# Patient Record
Sex: MALE | Race: WHITE | NOT HISPANIC OR LATINO | Employment: UNEMPLOYED | URBAN - METROPOLITAN AREA
[De-identification: names, ages, dates, MRNs, and addresses within clinical notes are randomized per-mention and may not be internally consistent; named-entity substitution may affect disease eponyms.]

---

## 2017-01-12 ENCOUNTER — TELEPHONE (OUTPATIENT)
Dept: VASCULAR SURGERY | Facility: CLINIC | Age: 57
End: 2017-01-12

## 2017-01-16 DIAGNOSIS — R60.0 LOCALIZED EDEMA: Primary | ICD-10-CM

## 2017-02-22 ENCOUNTER — TELEPHONE (OUTPATIENT)
Dept: VASCULAR SURGERY | Facility: CLINIC | Age: 57
End: 2017-02-22

## 2017-03-15 ENCOUNTER — TELEPHONE (OUTPATIENT)
Dept: RADIOLOGY | Facility: HOSPITAL | Age: 57
End: 2017-03-15

## 2017-03-16 ENCOUNTER — HOSPITAL ENCOUNTER (OUTPATIENT)
Dept: RADIOLOGY | Facility: HOSPITAL | Age: 57
Discharge: HOME | End: 2017-03-16
Attending: THORACIC SURGERY (CARDIOTHORACIC VASCULAR SURGERY)
Payer: MEDICARE

## 2017-03-16 ENCOUNTER — TELEPHONE (OUTPATIENT)
Dept: VASCULAR SURGERY | Facility: CLINIC | Age: 57
End: 2017-03-16

## 2017-03-16 DIAGNOSIS — R60.0 LOCALIZED EDEMA: ICD-10-CM

## 2017-03-16 PROCEDURE — 93970 EXTREMITY STUDY: CPT | Mod: TC,PO

## 2017-03-16 PROCEDURE — 93970 EXTREMITY STUDY: CPT | Mod: 26,,, | Performed by: RADIOLOGY

## 2017-03-16 NOTE — TELEPHONE ENCOUNTER
Oliver w/ [facility] states upon doing scan she detected blood clot in right  Pt to see his PCP this afternoon.   on vacation.

## 2017-03-30 ENCOUNTER — OFFICE VISIT (OUTPATIENT)
Dept: VASCULAR SURGERY | Facility: CLINIC | Age: 57
End: 2017-03-30
Payer: MEDICARE

## 2017-03-30 VITALS
RESPIRATION RATE: 16 BRPM | BODY MASS INDEX: 25.67 KG/M2 | WEIGHT: 200 LBS | SYSTOLIC BLOOD PRESSURE: 145 MMHG | DIASTOLIC BLOOD PRESSURE: 99 MMHG | HEIGHT: 74 IN | HEART RATE: 81 BPM

## 2017-03-30 DIAGNOSIS — F17.218 NICOTINE DEPENDENCE, CIGARETTES, WITH OTHER NICOTINE-INDUCED DISORDERS: ICD-10-CM

## 2017-03-30 DIAGNOSIS — I83.813 VARICOSE VEINS OF BOTH LOWER EXTREMITIES WITH PAIN: ICD-10-CM

## 2017-03-30 DIAGNOSIS — I87.2 VENOUS INSUFFICIENCY OF LEFT LOWER EXTREMITY: Primary | ICD-10-CM

## 2017-03-30 PROCEDURE — 99406 BEHAV CHNG SMOKING 3-10 MIN: CPT | Mod: S$PBB,,, | Performed by: THORACIC SURGERY (CARDIOTHORACIC VASCULAR SURGERY)

## 2017-03-30 PROCEDURE — 99215 OFFICE O/P EST HI 40 MIN: CPT | Mod: 25,S$PBB,, | Performed by: THORACIC SURGERY (CARDIOTHORACIC VASCULAR SURGERY)

## 2017-03-30 PROCEDURE — 99213 OFFICE O/P EST LOW 20 MIN: CPT | Mod: PBBFAC,PO | Performed by: THORACIC SURGERY (CARDIOTHORACIC VASCULAR SURGERY)

## 2017-03-30 PROCEDURE — 99999 PR PBB SHADOW E&M-EST. PATIENT-LVL III: CPT | Mod: PBBFAC,,, | Performed by: THORACIC SURGERY (CARDIOTHORACIC VASCULAR SURGERY)

## 2017-03-30 RX ORDER — TRAZODONE HYDROCHLORIDE 100 MG/1
TABLET ORAL
COMMUNITY
Start: 2017-03-10 | End: 2021-05-26

## 2017-03-30 RX ORDER — HYDROCODONE BITARTRATE AND ACETAMINOPHEN 7.5; 325 MG/1; MG/1
1 TABLET ORAL EVERY 6 HOURS PRN
Refills: 0 | COMMUNITY
Start: 2017-03-16 | End: 2021-05-26

## 2017-03-30 RX ORDER — INDOMETHACIN 50 MG/1
50 CAPSULE ORAL
COMMUNITY
Start: 2015-12-23 | End: 2021-05-26

## 2017-03-30 RX ORDER — LEVETIRACETAM 750 MG/1
TABLET ORAL
Status: ON HOLD | COMMUNITY
Start: 2017-02-12 | End: 2023-08-20 | Stop reason: HOSPADM

## 2017-03-30 RX ORDER — METHOCARBAMOL 750 MG/1
750 TABLET, FILM COATED ORAL 3 TIMES DAILY PRN
Refills: 1 | COMMUNITY
Start: 2017-03-21 | End: 2021-05-26

## 2017-03-30 NOTE — PROGRESS NOTES
OFFICE VISIT NOTE    HISTORY OF PRESENT ILLNESS:  The patient is a 57-year-old gentleman who I last   saw in the early parts of 2014.  He was sent to me in consultation for a right   lower extremity venous stasis ulcer.  We performed endovenous laser ablation of   the right greater saphenous vein and the right lesser saphenous vein along with   Veinlite-guided sclerotherapy.  The ulcer healed up and has not recurred.    However, the patient is complaining of persistent pain on the lateral aspect of   his right foot, mostly inferior to the lateral malleolus and over the lateral   aspect of the ankle.  He is also complaining of pain on the medial aspect of the   middle one-third of the left leg.  The patient has been wearing tight socks,   but not compression stockings that is prescription.  He continues to smoke   cigarettes.    PAST MEDICAL HISTORY:  Venous stasis ulcers of the right leg, seizure disorder,   venous insufficiency of bilateral greater saphenous veins, pain in bilateral   lower extremities.  Deep venous thrombosis of bilateral lower extremities.    PAST SURGICAL HISTORY:  Endovenous laser treatment of the right greater and the   right lesser saphenous veins.    MEDICATIONS:  Indocin, Keppra, Xarelto, Desyrel, Lortab, Norco, Robaxin.    ALLERGIES:  No known drug allergies.    FAMILY HISTORY:  Noncontributory.    SOCIAL HISTORY:  He smokes a quarter-pack of cigarettes per day.  He denies   alcohol use.  He was counseled regarding smoking cessation for about 5 minutes.    I counseled him regarding this in 2014 with the patient has not even tried to   quit smoking.  Again, the effects of cigarette smoking on the cardiovascular and   respiratory systems were discussed.    REVISION OF SYSTEMS:  He complains of pain of bilateral lower extremities with   discoloration.  He also complains of some varicose veins.  All other systems   were reviewed and are negative.    PHYSICAL EXAMINATION:  VITAL SIGNS:  Blood  pressure is 145/99, respiratory rate is 16, heart rate is   81, height 6 feet 2 inches, weight 200 pounds.  GENERAL:  He is awake and alert, in no apparent distress.  HEENT:  Head is normocephalic.  Pupils are equal, round, reactive.  Sclerae are   anicteric.  NECK:  Supple.  LUNGS:  Clear.  HEART:  Has a regular rate and rhythm.  ABDOMEN:  Soft and nontender.  EXTREMITIES:  He has hyperpigmentation of bilateral legs, ankle and feet, worse   on the right than on the left.  He has scars on the medial aspect of the distal   right leg consistent with healed venous stasis ulcers.  Feet are pink and warm   with good capillary refill.  He has varicose veins of bilateral lower   extremities.  PULSE:  2+ brachial, 2+ radial, 2+ femoral, 2+ dorsalis pedis pulses   bilaterally.  NEUROLOGIC:  Awake, alert and oriented.  No lateralizing neurologic deficits.    Duplex scan of the veins of the lower extremities showed occluded right greater   and right lesser saphenous veins from previous endovenous laser treatment.  The   left greater saphenous vein had insufficiency, but the vein was normal in size.    At the saphenofemoral junction, it measured 6.4 mm, but below that it measured   5.5 mm.    IMPRESSION:  1.  Pain of bilateral lower extremities.  2.  Varicose veins of bilateral lower extremities.  3.  Status post EVLT of the right greater saphenous vein.  4.  Status post EVLT of the right lesser saphenous vein.  5.  Status post Veinlite-guided sclerotherapy of the right lower extremity.  6.  History of venous stasis of the right lower extremity.  7.  Venous stasis of bilateral lower extremities.    RECOMMENDATIONS:  The patient has no insufficient veins in the right lower   extremity, but continues to have pain.  He has venous insufficiency of the left   greater saphenous vein and the lesser saphenous vein, but these are normal in   size.  It can still be used as conduit for bypass.  His pain in his left lower   extremity is  limited to an area of approximately 8 cm by about 3 cm.  The   patient needs to quit smoking and he was counseled regarding this as described   above for about 5 minutes.  I think he needs to wear compression stockings.  A   new prescription for knee high 20 to 30 mmHg compression stockings was given.    The only other thing I have to offer would be endovenous laser treatment of the   left greater saphenous vein and possibly the lesser saphenous vein, but that   would do nothing to help the pain in the right lower extremity and the vein is   not enlarged.  He is also no encourage to elevate the legs as much as possible   and exercise as much as possible.      JENNI  dd: 03/30/2017 09:20:03 (CDT)  td: 03/31/2017 03:35:49 (CDT)  Doc ID   #[id number]  Job ID #[id number]    CC:

## 2021-05-26 ENCOUNTER — HOSPITAL ENCOUNTER (EMERGENCY)
Facility: HOSPITAL | Age: 61
Discharge: HOME | End: 2021-05-26
Attending: EMERGENCY MEDICINE
Payer: COMMERCIAL

## 2021-05-26 VITALS
WEIGHT: 175 LBS | DIASTOLIC BLOOD PRESSURE: 82 MMHG | OXYGEN SATURATION: 99 % | BODY MASS INDEX: 22.46 KG/M2 | TEMPERATURE: 99 F | HEIGHT: 74 IN | RESPIRATION RATE: 14 BRPM | HEART RATE: 70 BPM | SYSTOLIC BLOOD PRESSURE: 135 MMHG

## 2021-05-26 DIAGNOSIS — E86.0 DEHYDRATION: ICD-10-CM

## 2021-05-26 DIAGNOSIS — I95.9 HYPOTENSION: Primary | ICD-10-CM

## 2021-05-26 DIAGNOSIS — Z20.822 COVID-19 VIRUS NOT DETECTED: ICD-10-CM

## 2021-05-26 LAB
ALBUMIN SERPL BCP-MCNC: 3.6 G/DL (ref 3.5–5.2)
ALP SERPL-CCNC: 53 U/L (ref 55–135)
ALT SERPL W/O P-5'-P-CCNC: 23 U/L (ref 10–44)
ANION GAP SERPL CALC-SCNC: 11 MMOL/L (ref 8–16)
AST SERPL-CCNC: 29 U/L (ref 10–40)
BASOPHILS # BLD AUTO: 0.02 K/UL (ref 0–0.2)
BASOPHILS NFR BLD: 0.5 % (ref 0–1.9)
BILIRUB SERPL-MCNC: 0.6 MG/DL (ref 0.1–1)
BILIRUB UR QL STRIP: NEGATIVE
BNP SERPL-MCNC: 63 PG/ML (ref 0–99)
BUN SERPL-MCNC: 10 MG/DL (ref 8–23)
CALCIUM SERPL-MCNC: 8.7 MG/DL (ref 8.7–10.5)
CHLORIDE SERPL-SCNC: 102 MMOL/L (ref 95–110)
CLARITY UR: CLEAR
CO2 SERPL-SCNC: 22 MMOL/L (ref 23–29)
COLOR UR: YELLOW
CREAT SERPL-MCNC: 1.1 MG/DL (ref 0.5–1.4)
CTP QC/QA: YES
DIFFERENTIAL METHOD: ABNORMAL
EOSINOPHIL # BLD AUTO: 0.1 K/UL (ref 0–0.5)
EOSINOPHIL NFR BLD: 2.1 % (ref 0–8)
ERYTHROCYTE [DISTWIDTH] IN BLOOD BY AUTOMATED COUNT: 13.7 % (ref 11.5–14.5)
EST. GFR  (AFRICAN AMERICAN): >60 ML/MIN/1.73 M^2
EST. GFR  (NON AFRICAN AMERICAN): >60 ML/MIN/1.73 M^2
GLUCOSE SERPL-MCNC: 167 MG/DL (ref 70–110)
GLUCOSE UR QL STRIP: NEGATIVE
HCT VFR BLD AUTO: 44.4 % (ref 40–54)
HGB BLD-MCNC: 14.9 G/DL (ref 14–18)
HGB UR QL STRIP: NEGATIVE
IMM GRANULOCYTES # BLD AUTO: 0.01 K/UL (ref 0–0.04)
IMM GRANULOCYTES NFR BLD AUTO: 0.2 % (ref 0–0.5)
KETONES UR QL STRIP: NEGATIVE
LACTATE SERPL-SCNC: 1.6 MMOL/L (ref 0.5–2.2)
LACTATE SERPL-SCNC: 2.4 MMOL/L (ref 0.5–2.2)
LEUKOCYTE ESTERASE UR QL STRIP: NEGATIVE
LYMPHOCYTES # BLD AUTO: 1.2 K/UL (ref 1–4.8)
LYMPHOCYTES NFR BLD: 27.5 % (ref 18–48)
MCH RBC QN AUTO: 31.1 PG (ref 27–31)
MCHC RBC AUTO-ENTMCNC: 33.6 G/DL (ref 32–36)
MCV RBC AUTO: 93 FL (ref 82–98)
MONOCYTES # BLD AUTO: 0.4 K/UL (ref 0.3–1)
MONOCYTES NFR BLD: 8.3 % (ref 4–15)
NEUTROPHILS # BLD AUTO: 2.7 K/UL (ref 1.8–7.7)
NEUTROPHILS NFR BLD: 61.4 % (ref 38–73)
NITRITE UR QL STRIP: NEGATIVE
NRBC BLD-RTO: 0 /100 WBC
PH UR STRIP: 7 [PH] (ref 5–8)
PLATELET # BLD AUTO: 169 K/UL (ref 150–450)
PMV BLD AUTO: 10.6 FL (ref 9.2–12.9)
POTASSIUM SERPL-SCNC: 3.8 MMOL/L (ref 3.5–5.1)
PROCALCITONIN SERPL IA-MCNC: 0.04 NG/ML
PROT SERPL-MCNC: 7.4 G/DL (ref 6–8.4)
PROT UR QL STRIP: NEGATIVE
RBC # BLD AUTO: 4.79 M/UL (ref 4.6–6.2)
SARS-COV-2 RDRP RESP QL NAA+PROBE: NEGATIVE
SODIUM SERPL-SCNC: 135 MMOL/L (ref 136–145)
SP GR UR STRIP: 1.02 (ref 1–1.03)
TROPONIN I SERPL DL<=0.01 NG/ML-MCNC: 0.01 NG/ML (ref 0–0.03)
URN SPEC COLLECT METH UR: NORMAL
UROBILINOGEN UR STRIP-ACNC: NEGATIVE EU/DL
WBC # BLD AUTO: 4.32 K/UL (ref 3.9–12.7)

## 2021-05-26 PROCEDURE — 93010 EKG 12-LEAD: ICD-10-PCS | Mod: ,,, | Performed by: INTERNAL MEDICINE

## 2021-05-26 PROCEDURE — 99285 EMERGENCY DEPT VISIT HI MDM: CPT | Mod: 25

## 2021-05-26 PROCEDURE — 25000003 PHARM REV CODE 250: Performed by: REGISTERED NURSE

## 2021-05-26 PROCEDURE — 84145 PROCALCITONIN (PCT): CPT | Performed by: REGISTERED NURSE

## 2021-05-26 PROCEDURE — 83605 ASSAY OF LACTIC ACID: CPT | Mod: 91 | Performed by: REGISTERED NURSE

## 2021-05-26 PROCEDURE — 83880 ASSAY OF NATRIURETIC PEPTIDE: CPT | Performed by: REGISTERED NURSE

## 2021-05-26 PROCEDURE — U0002 COVID-19 LAB TEST NON-CDC: HCPCS | Performed by: REGISTERED NURSE

## 2021-05-26 PROCEDURE — 93010 ELECTROCARDIOGRAM REPORT: CPT | Mod: ,,, | Performed by: INTERNAL MEDICINE

## 2021-05-26 PROCEDURE — 96361 HYDRATE IV INFUSION ADD-ON: CPT

## 2021-05-26 PROCEDURE — 87040 BLOOD CULTURE FOR BACTERIA: CPT | Mod: 59 | Performed by: REGISTERED NURSE

## 2021-05-26 PROCEDURE — 85025 COMPLETE CBC W/AUTO DIFF WBC: CPT | Performed by: REGISTERED NURSE

## 2021-05-26 PROCEDURE — 96360 HYDRATION IV INFUSION INIT: CPT

## 2021-05-26 PROCEDURE — 81003 URINALYSIS AUTO W/O SCOPE: CPT | Performed by: REGISTERED NURSE

## 2021-05-26 PROCEDURE — 93005 ELECTROCARDIOGRAM TRACING: CPT

## 2021-05-26 PROCEDURE — 84484 ASSAY OF TROPONIN QUANT: CPT | Performed by: REGISTERED NURSE

## 2021-05-26 PROCEDURE — 80053 COMPREHEN METABOLIC PANEL: CPT | Performed by: REGISTERED NURSE

## 2021-05-26 RX ORDER — ASPIRIN 81 MG/1
81 TABLET ORAL DAILY
COMMUNITY

## 2021-05-26 RX ADMIN — SODIUM CHLORIDE 1400 ML: 0.9 INJECTION, SOLUTION INTRAVENOUS at 07:05

## 2021-05-26 RX ADMIN — SODIUM CHLORIDE 1000 ML: 0.9 INJECTION, SOLUTION INTRAVENOUS at 05:05

## 2021-06-01 LAB
BACTERIA BLD CULT: NORMAL
BACTERIA BLD CULT: NORMAL

## 2023-08-15 ENCOUNTER — HOSPITAL ENCOUNTER (INPATIENT)
Facility: HOSPITAL | Age: 63
LOS: 5 days | Discharge: HOME | DRG: 100 | End: 2023-08-20
Attending: INTERNAL MEDICINE | Admitting: INTERNAL MEDICINE
Payer: MEDICAID

## 2023-08-15 DIAGNOSIS — G40.901 NONINTRACTABLE EPILEPSY WITH STATUS EPILEPTICUS, UNSPECIFIED EPILEPSY TYPE: Primary | ICD-10-CM

## 2023-08-15 DIAGNOSIS — G40.901 STATUS EPILEPTICUS: ICD-10-CM

## 2023-08-15 DIAGNOSIS — G93.40 ENCEPHALOPATHY: ICD-10-CM

## 2023-08-15 PROBLEM — Z97.8 ENDOTRACHEALLY INTUBATED: Status: ACTIVE | Noted: 2023-08-15

## 2023-08-15 PROBLEM — Z86.718 HISTORY OF DVT IN ADULTHOOD: Status: ACTIVE | Noted: 2023-08-15

## 2023-08-15 PROBLEM — Z95.828 PRESENCE OF IVC FILTER: Status: ACTIVE | Noted: 2023-08-15

## 2023-08-15 PROBLEM — R79.89 ELEVATED LACTIC ACID LEVEL: Status: ACTIVE | Noted: 2023-08-15

## 2023-08-15 PROBLEM — Z99.11 ON MECHANICALLY ASSISTED VENTILATION: Status: ACTIVE | Noted: 2023-08-15

## 2023-08-15 PROBLEM — E72.20 HYPERAMMONEMIA: Status: ACTIVE | Noted: 2023-08-15

## 2023-08-15 PROBLEM — J69.0 ASPIRATION PNEUMONIA OF RIGHT LOWER LOBE: Status: ACTIVE | Noted: 2023-08-15

## 2023-08-15 PROBLEM — Z79.01 CHRONIC ANTICOAGULATION: Status: ACTIVE | Noted: 2023-08-15

## 2023-08-15 PROCEDURE — 99900035 HC TECH TIME PER 15 MIN (STAT)

## 2023-08-15 PROCEDURE — 99900026 HC AIRWAY MAINTENANCE (STAT)

## 2023-08-15 PROCEDURE — 94003 VENT MGMT INPAT SUBQ DAY: CPT

## 2023-08-15 PROCEDURE — 63600175 PHARM REV CODE 636 W HCPCS: Performed by: STUDENT IN AN ORGANIZED HEALTH CARE EDUCATION/TRAINING PROGRAM

## 2023-08-15 PROCEDURE — 27200966 HC CLOSED SUCTION SYSTEM

## 2023-08-15 PROCEDURE — 94761 N-INVAS EAR/PLS OXIMETRY MLT: CPT

## 2023-08-15 PROCEDURE — 20000000 HC ICU ROOM

## 2023-08-15 PROCEDURE — 27000221 HC OXYGEN, UP TO 24 HOURS

## 2023-08-15 PROCEDURE — 25000003 PHARM REV CODE 250: Performed by: STUDENT IN AN ORGANIZED HEALTH CARE EDUCATION/TRAINING PROGRAM

## 2023-08-15 RX ORDER — NAPROXEN SODIUM 220 MG/1
81 TABLET, FILM COATED ORAL DAILY
Status: DISCONTINUED | OUTPATIENT
Start: 2023-08-16 | End: 2023-08-18

## 2023-08-15 RX ORDER — POTASSIUM CHLORIDE 7.45 MG/ML
80 INJECTION INTRAVENOUS
Status: DISCONTINUED | OUTPATIENT
Start: 2023-08-15 | End: 2023-08-16

## 2023-08-15 RX ORDER — MAGNESIUM SULFATE HEPTAHYDRATE 40 MG/ML
4 INJECTION, SOLUTION INTRAVENOUS
Status: DISCONTINUED | OUTPATIENT
Start: 2023-08-15 | End: 2023-08-16

## 2023-08-15 RX ORDER — FAMOTIDINE 10 MG/ML
20 INJECTION INTRAVENOUS EVERY 12 HOURS
Status: DISCONTINUED | OUTPATIENT
Start: 2023-08-15 | End: 2023-08-17

## 2023-08-15 RX ORDER — MAGNESIUM SULFATE HEPTAHYDRATE 40 MG/ML
2 INJECTION, SOLUTION INTRAVENOUS
Status: DISCONTINUED | OUTPATIENT
Start: 2023-08-15 | End: 2023-08-16

## 2023-08-15 RX ORDER — SODIUM CHLORIDE 0.9 % (FLUSH) 0.9 %
10 SYRINGE (ML) INJECTION
Status: DISCONTINUED | OUTPATIENT
Start: 2023-08-15 | End: 2023-08-20 | Stop reason: HOSPADM

## 2023-08-15 RX ORDER — POTASSIUM CHLORIDE 7.45 MG/ML
60 INJECTION INTRAVENOUS
Status: DISCONTINUED | OUTPATIENT
Start: 2023-08-15 | End: 2023-08-16

## 2023-08-15 RX ORDER — LEVETIRACETAM 500 MG/5ML
1500 INJECTION, SOLUTION, CONCENTRATE INTRAVENOUS EVERY 12 HOURS
Status: DISCONTINUED | OUTPATIENT
Start: 2023-08-15 | End: 2023-08-18

## 2023-08-15 RX ORDER — FENTANYL CITRATE-0.9 % NACL/PF 10 MCG/ML
0-200 PLASTIC BAG, INJECTION (ML) INTRAVENOUS CONTINUOUS
Status: DISCONTINUED | OUTPATIENT
Start: 2023-08-15 | End: 2023-08-17

## 2023-08-15 RX ORDER — POTASSIUM CHLORIDE 7.45 MG/ML
40 INJECTION INTRAVENOUS
Status: DISCONTINUED | OUTPATIENT
Start: 2023-08-15 | End: 2023-08-16

## 2023-08-15 RX ADMIN — LEVETIRACETAM 1500 MG: 100 INJECTION INTRAVENOUS at 11:08

## 2023-08-15 RX ADMIN — FAMOTIDINE 20 MG: 10 INJECTION, SOLUTION INTRAVENOUS at 11:08

## 2023-08-15 RX ADMIN — PROPOFOL 35 MCG/KG/MIN: 10 INJECTION, EMULSION INTRAVENOUS at 09:08

## 2023-08-15 RX ADMIN — Medication 150 MCG/HR: at 08:08

## 2023-08-16 PROBLEM — G93.40 ENCEPHALOPATHY: Status: ACTIVE | Noted: 2023-08-16

## 2023-08-16 LAB
ALBUMIN SERPL BCP-MCNC: 3 G/DL (ref 3.5–5.2)
ALLENS TEST: ABNORMAL
ALP SERPL-CCNC: 53 U/L (ref 55–135)
ALT SERPL W/O P-5'-P-CCNC: 7 U/L (ref 10–44)
AMMONIA PLAS-SCNC: 24 UMOL/L (ref 10–50)
AMORPH CRY UR QL COMP ASSIST: ABNORMAL
ANION GAP SERPL CALC-SCNC: 9 MMOL/L (ref 8–16)
AST SERPL-CCNC: 24 U/L (ref 10–40)
BACTERIA #/AREA URNS AUTO: ABNORMAL /HPF
BASOPHILS # BLD AUTO: 0.03 K/UL (ref 0–0.2)
BASOPHILS NFR BLD: 0.5 % (ref 0–1.9)
BILIRUB SERPL-MCNC: 0.7 MG/DL (ref 0.1–1)
BILIRUB UR QL STRIP: NEGATIVE
BUN SERPL-MCNC: 13 MG/DL (ref 8–23)
CALCIUM SERPL-MCNC: 7.9 MG/DL (ref 8.7–10.5)
CHLORIDE SERPL-SCNC: 101 MMOL/L (ref 95–110)
CLARITY UR REFRACT.AUTO: ABNORMAL
CO2 SERPL-SCNC: 23 MMOL/L (ref 23–29)
COLOR UR AUTO: ABNORMAL
CREAT SERPL-MCNC: 1 MG/DL (ref 0.5–1.4)
DELSYS: ABNORMAL
DIFFERENTIAL METHOD: ABNORMAL
EOSINOPHIL # BLD AUTO: 0.1 K/UL (ref 0–0.5)
EOSINOPHIL NFR BLD: 1.6 % (ref 0–8)
ERYTHROCYTE [DISTWIDTH] IN BLOOD BY AUTOMATED COUNT: 13.3 % (ref 11.5–14.5)
ERYTHROCYTE [SEDIMENTATION RATE] IN BLOOD BY WESTERGREN METHOD: 16 MM/H
EST. GFR  (NO RACE VARIABLE): >60 ML/MIN/1.73 M^2
FIO2: 30
GLUCOSE SERPL-MCNC: 78 MG/DL (ref 70–110)
GLUCOSE UR QL STRIP: NEGATIVE
HCO3 UR-SCNC: 25.8 MMOL/L (ref 24–28)
HCT VFR BLD AUTO: 34.3 % (ref 40–54)
HGB BLD-MCNC: 11.6 G/DL (ref 14–18)
HGB UR QL STRIP: ABNORMAL
HYALINE CASTS UR QL AUTO: 0 /LPF
IMM GRANULOCYTES # BLD AUTO: 0.02 K/UL (ref 0–0.04)
IMM GRANULOCYTES NFR BLD AUTO: 0.3 % (ref 0–0.5)
KETONES UR QL STRIP: NEGATIVE
LACTATE SERPL-SCNC: 1 MMOL/L (ref 0.5–2.2)
LEUKOCYTE ESTERASE UR QL STRIP: NEGATIVE
LYMPHOCYTES # BLD AUTO: 1 K/UL (ref 1–4.8)
LYMPHOCYTES NFR BLD: 15.5 % (ref 18–48)
MAGNESIUM SERPL-MCNC: 2 MG/DL (ref 1.6–2.6)
MCH RBC QN AUTO: 29.1 PG (ref 27–31)
MCHC RBC AUTO-ENTMCNC: 33.8 G/DL (ref 32–36)
MCV RBC AUTO: 86 FL (ref 82–98)
MICROSCOPIC COMMENT: ABNORMAL
MODE: ABNORMAL
MONOCYTES # BLD AUTO: 0.7 K/UL (ref 0.3–1)
MONOCYTES NFR BLD: 11.1 % (ref 4–15)
NEUTROPHILS # BLD AUTO: 4.6 K/UL (ref 1.8–7.7)
NEUTROPHILS NFR BLD: 71 % (ref 38–73)
NITRITE UR QL STRIP: NEGATIVE
NRBC BLD-RTO: 0 /100 WBC
PCO2 BLDA: 45.4 MMHG (ref 35–45)
PEEP: 5
PH SMN: 7.36 [PH] (ref 7.35–7.45)
PH UR STRIP: 6 [PH] (ref 5–8)
PHOSPHATE SERPL-MCNC: 2.2 MG/DL (ref 2.7–4.5)
PLATELET # BLD AUTO: 150 K/UL (ref 150–450)
PMV BLD AUTO: 10.6 FL (ref 9.2–12.9)
PO2 BLDA: 130 MMHG (ref 80–100)
POC BE: 0 MMOL/L
POC SATURATED O2: 99 % (ref 95–100)
POC TCO2: 27 MMOL/L (ref 23–27)
POTASSIUM SERPL-SCNC: 3.2 MMOL/L (ref 3.5–5.1)
PROT SERPL-MCNC: 7 G/DL (ref 6–8.4)
PROT UR QL STRIP: ABNORMAL
RBC # BLD AUTO: 3.98 M/UL (ref 4.6–6.2)
RBC #/AREA URNS AUTO: 33 /HPF (ref 0–4)
SAMPLE: ABNORMAL
SITE: ABNORMAL
SODIUM SERPL-SCNC: 133 MMOL/L (ref 136–145)
SP GR UR STRIP: >1.03 (ref 1–1.03)
T4 FREE SERPL-MCNC: 0.81 NG/DL (ref 0.71–1.51)
TSH SERPL DL<=0.005 MIU/L-ACNC: 24.39 UIU/ML (ref 0.4–4)
URN SPEC COLLECT METH UR: ABNORMAL
VT: 420
WBC # BLD AUTO: 6.4 K/UL (ref 3.9–12.7)
WBC #/AREA URNS AUTO: 0 /HPF (ref 0–5)

## 2023-08-16 PROCEDURE — 82803 BLOOD GASES ANY COMBINATION: CPT

## 2023-08-16 PROCEDURE — 84439 ASSAY OF FREE THYROXINE: CPT | Performed by: NURSE PRACTITIONER

## 2023-08-16 PROCEDURE — 27100171 HC OXYGEN HIGH FLOW UP TO 24 HOURS

## 2023-08-16 PROCEDURE — 63600175 PHARM REV CODE 636 W HCPCS: Performed by: STUDENT IN AN ORGANIZED HEALTH CARE EDUCATION/TRAINING PROGRAM

## 2023-08-16 PROCEDURE — 95714 VEEG EA 12-26 HR UNMNTR: CPT

## 2023-08-16 PROCEDURE — 85025 COMPLETE CBC W/AUTO DIFF WBC: CPT | Performed by: STUDENT IN AN ORGANIZED HEALTH CARE EDUCATION/TRAINING PROGRAM

## 2023-08-16 PROCEDURE — 25000003 PHARM REV CODE 250: Performed by: STUDENT IN AN ORGANIZED HEALTH CARE EDUCATION/TRAINING PROGRAM

## 2023-08-16 PROCEDURE — 81001 URINALYSIS AUTO W/SCOPE: CPT | Performed by: STUDENT IN AN ORGANIZED HEALTH CARE EDUCATION/TRAINING PROGRAM

## 2023-08-16 PROCEDURE — 84443 ASSAY THYROID STIM HORMONE: CPT | Performed by: NURSE PRACTITIONER

## 2023-08-16 PROCEDURE — 51798 US URINE CAPACITY MEASURE: CPT

## 2023-08-16 PROCEDURE — 99900035 HC TECH TIME PER 15 MIN (STAT)

## 2023-08-16 PROCEDURE — 84100 ASSAY OF PHOSPHORUS: CPT | Performed by: STUDENT IN AN ORGANIZED HEALTH CARE EDUCATION/TRAINING PROGRAM

## 2023-08-16 PROCEDURE — 20000000 HC ICU ROOM

## 2023-08-16 PROCEDURE — 99900026 HC AIRWAY MAINTENANCE (STAT)

## 2023-08-16 PROCEDURE — 82140 ASSAY OF AMMONIA: CPT | Performed by: INTERNAL MEDICINE

## 2023-08-16 PROCEDURE — 25000003 PHARM REV CODE 250: Performed by: INTERNAL MEDICINE

## 2023-08-16 PROCEDURE — 36600 WITHDRAWAL OF ARTERIAL BLOOD: CPT

## 2023-08-16 PROCEDURE — 63600175 PHARM REV CODE 636 W HCPCS: Performed by: NURSE PRACTITIONER

## 2023-08-16 PROCEDURE — 80053 COMPREHEN METABOLIC PANEL: CPT | Performed by: STUDENT IN AN ORGANIZED HEALTH CARE EDUCATION/TRAINING PROGRAM

## 2023-08-16 PROCEDURE — 95700 EEG CONT REC W/VID EEG TECH: CPT

## 2023-08-16 PROCEDURE — 95720 EEG PHY/QHP EA INCR W/VEEG: CPT | Mod: ,,, | Performed by: PSYCHIATRY & NEUROLOGY

## 2023-08-16 PROCEDURE — 83605 ASSAY OF LACTIC ACID: CPT | Performed by: STUDENT IN AN ORGANIZED HEALTH CARE EDUCATION/TRAINING PROGRAM

## 2023-08-16 PROCEDURE — 25000003 PHARM REV CODE 250: Performed by: NURSE PRACTITIONER

## 2023-08-16 PROCEDURE — 95720 PR EEG, W/VIDEO, CONT RECORD, I&R, >12<26 HRS: ICD-10-PCS | Mod: ,,, | Performed by: PSYCHIATRY & NEUROLOGY

## 2023-08-16 PROCEDURE — 27000221 HC OXYGEN, UP TO 24 HOURS

## 2023-08-16 PROCEDURE — 83735 ASSAY OF MAGNESIUM: CPT | Performed by: STUDENT IN AN ORGANIZED HEALTH CARE EDUCATION/TRAINING PROGRAM

## 2023-08-16 PROCEDURE — 27200966 HC CLOSED SUCTION SYSTEM

## 2023-08-16 PROCEDURE — 94003 VENT MGMT INPAT SUBQ DAY: CPT

## 2023-08-16 PROCEDURE — 94761 N-INVAS EAR/PLS OXIMETRY MLT: CPT

## 2023-08-16 RX ORDER — SODIUM,POTASSIUM PHOSPHATES 280-250MG
2 POWDER IN PACKET (EA) ORAL
Status: DISCONTINUED | OUTPATIENT
Start: 2023-08-16 | End: 2023-08-18

## 2023-08-16 RX ORDER — HEPARIN SODIUM 5000 [USP'U]/ML
5000 INJECTION, SOLUTION INTRAVENOUS; SUBCUTANEOUS EVERY 8 HOURS
Status: DISCONTINUED | OUTPATIENT
Start: 2023-08-16 | End: 2023-08-16

## 2023-08-16 RX ORDER — AMOXICILLIN 250 MG
1 CAPSULE ORAL DAILY
Status: DISCONTINUED | OUTPATIENT
Start: 2023-08-16 | End: 2023-08-18

## 2023-08-16 RX ORDER — MUPIROCIN 20 MG/G
OINTMENT TOPICAL 2 TIMES DAILY
Status: DISCONTINUED | OUTPATIENT
Start: 2023-08-16 | End: 2023-08-20 | Stop reason: HOSPADM

## 2023-08-16 RX ORDER — ENOXAPARIN SODIUM 100 MG/ML
40 INJECTION SUBCUTANEOUS EVERY 24 HOURS
Status: DISCONTINUED | OUTPATIENT
Start: 2023-08-16 | End: 2023-08-18

## 2023-08-16 RX ADMIN — POTASSIUM BICARBONATE 35 MEQ: 391 TABLET, EFFERVESCENT ORAL at 10:08

## 2023-08-16 RX ADMIN — MUPIROCIN: 20 OINTMENT TOPICAL at 08:08

## 2023-08-16 RX ADMIN — FAMOTIDINE 20 MG: 10 INJECTION, SOLUTION INTRAVENOUS at 08:08

## 2023-08-16 RX ADMIN — LEVETIRACETAM 1500 MG: 100 INJECTION INTRAVENOUS at 08:08

## 2023-08-16 RX ADMIN — POTASSIUM BICARBONATE 35 MEQ: 391 TABLET, EFFERVESCENT ORAL at 05:08

## 2023-08-16 RX ADMIN — ENOXAPARIN SODIUM 40 MG: 40 INJECTION SUBCUTANEOUS at 05:08

## 2023-08-16 RX ADMIN — ASPIRIN 81 MG 81 MG: 81 TABLET ORAL at 08:08

## 2023-08-16 RX ADMIN — SENNOSIDES AND DOCUSATE SODIUM 1 TABLET: 50; 8.6 TABLET ORAL at 10:08

## 2023-08-16 RX ADMIN — POTASSIUM & SODIUM PHOSPHATES POWDER PACK 280-160-250 MG 2 PACKET: 280-160-250 PACK at 05:08

## 2023-08-16 RX ADMIN — POTASSIUM & SODIUM PHOSPHATES POWDER PACK 280-160-250 MG 2 PACKET: 280-160-250 PACK at 10:08

## 2023-08-16 RX ADMIN — Medication 75 MCG/HR: at 02:08

## 2023-08-16 RX ADMIN — PROPOFOL 35 MCG/KG/MIN: 10 INJECTION, EMULSION INTRAVENOUS at 12:08

## 2023-08-16 NOTE — NURSING
Called [facility] to advise that the patient is on comfort care. Per [facility] just call when patient reach cardiac death or on vent.

## 2023-08-16 NOTE — SUBJECTIVE & OBJECTIVE
Past Medical History:   Diagnosis Date    DVT (deep venous thrombosis)     Stroke     Venous insufficiency      Past Surgical History:   Procedure Laterality Date    evlt      GUADALUPE FILTER PLACEMENT      REVISION TOTAL HIP ARTHROPLASTY Right       Current Facility-Administered Medications on File Prior to Encounter   Medication Dose Route Frequency Provider Last Rate Last Admin    [DISCONTINUED] GENERIC EXTERNAL MEDICATION   mcg/hr Intravenous  Provider, Generic External Data        [DISCONTINUED] propofol (DIPRIVAN) 10 mg/mL infusion  5-50 mcg/kg/min Intravenous  Provider, Generic External Data         Current Outpatient Medications on File Prior to Encounter   Medication Sig Dispense Refill    aspirin (ECOTRIN) 81 MG EC tablet Take 81 mg by mouth once daily.      levetiracetam (KEPPRA) 750 MG Tab TAKE 1 TABLET BY MOUTH 4 TIMES DAILY        Allergies: Patient has no known allergies.    No family history on file.  Social History     Tobacco Use    Smoking status: Every Day     Current packs/day: 0.80     Types: Cigarettes    Tobacco comments:     3/4 pk daily   Substance Use Topics    Alcohol use: No     Review of Systems   Unable to perform ROS: Intubated     Objective:     Vitals:    Pulse: 60  BP: 114/69  Resp: 16  SpO2: 100 %  Oxygen Concentration (%): 30  Vent Mode: A/C  Set Rate: 16 BPM  Vt Set: 420 mL  PEEP/CPAP: 5 cmH20  Peak Airway Pressure: 7.3 cmH20  Mean Airway Pressure: 7.2 cmH20  Plateau Pressure: 0 cmH20    Temp  Min: 98.2 °F (36.8 °C)  Max: 98.4 °F (36.9 °C)  Pulse  Min: 60  Max: 87  BP  Min: 93/56  Max: 122/81  Resp  Min: 16  Max: 16  SpO2  Min: 100 %  Max: 100 %  Oxygen Concentration (%)  Min: 30  Max: 50    No intake/output data recorded.            Physical Exam  Vitals and nursing note reviewed.   Constitutional:       Comments:   Intubated, mechanically ventilated  Sedated with Propofol and Fentanyl    HENT:      Head: Normocephalic and atraumatic.   Eyes:      Pupils: Pupils are  equal, round, and reactive to light.   Cardiovascular:      Rate and Rhythm: Normal rate and regular rhythm.      Pulses: Normal pulses.   Pulmonary:      Comments:   Intubated and mechanically ventilated   Abdominal:      General: Abdomen is flat. There is no distension.      Tenderness: There is no abdominal tenderness.   Musculoskeletal:         General: Normal range of motion.      Cervical back: Normal range of motion. No rigidity.   Skin:     General: Skin is warm.      Comments:   BLE venous stasis changes   Neurological:      Comments:   E3VTM5  Intubated and mechanically ventilated   PERRL, no gaze deviation   Moving all extremities spontaneously antigravity        Unable to test orientation, language, memory, judgment, insight, fund of knowledge, hearing, shoulder shrug, tongue protrusion, coordination, gait due to level of consciousness.       Today I personally reviewed pertinent medications, lines/drains/airways, imaging, cardiology results, laboratory results, microbiology results, notably:    OSH CTH without acute change (only read available)  OSH CT Chest with opacification of RLL (only read available)

## 2023-08-16 NOTE — ASSESSMENT & PLAN NOTE
63M with epilepsy maintained on Keppra 750mg QID, DVT on Xarelto s/p IVC filter placement who presents as a transfer from OSH for concern of status epilepticus. Intubated at OSH due to low GCS. Received 4.5g Keppra load and started on Propofol and Fentanyl. CTH with chronic small vessel ischemic change but without acute findings. CT Chest with opacification of right lower lobe, and fusiform dilation of the ascending thoracic aorta (4.2 cm).  Initial Lactate 4.9, Ammonia 43, UDS positive for amphetamines and benzodiazepines (given per EMS).     Neuro   - Admit to NCC  - Q1h neurochecks while in ICU   - moves all limbs spontaneously when woken, does not follow commands  - Q1h vitals while in ICU  - HOB@30  - Continue Keppra 1500mg BID  - MRI Brain w/o acute intracranial pathology   - Cap EEG overnight, formal hook up today  - Propofol @35  - Fentanyl to maintain RASS 0  - lactate down-trending     Cards  - SBP <160  - EKG  - Lipid panel, A1c pending  - Coags wnl     Resp  - intubated and sedated, mechanically ventilated  - OSH CXR with RLL opacification, repeat CXR today wnl   - ABG ordered pH 7.364, pCO2 45.4, pO2 130, HCO3 25.2    Renal  - Strict I/Os  - external catheter in place   - bladder scan q6h     FEN/GI  - Daily CMP, Mag, Phos - replete electrolytes PRN  - start trickle feeds today, consult nutrition     Endo  - TSH 25   - repeat TSH elevated   - T3 and T4 wnl     H/O  - Daily CBC, transfuse PRN  - ASA daily   - Lovenox DVT ppx (will clarify home Xarelto given h/o DVT and IVC filter)    ID  - ABx pending further work up, received Vanc + Cefepime x1 PTA  - afebrile, WCC wnl     Other  - PT/OT/SLP as appropriate  - CM/SW consult for dispo planning

## 2023-08-16 NOTE — NURSING
Patient arrived to [facility] from OSH by ambulance    Report received from: OSH RNIsabelle    Type of stroke/diagnosis: status epilepticus     Current symptoms: not able to follow commands, no motor deficits in eyes or extremities; pupils 2 and brisk    Skin Assessment done: Y  Wounds noted: ulcerations on ankles and heels of both feet  *If wounds noted, was Wound Care consulted?  Y  *If wounds noted, LDA placed? Y  Skin Assessment Verified by:  Krytsal Henao Completed? Yes; failed    Patient Belongings on Admit: Pill container(no pills), dentures and case, 's license, Health Blue card    NCC notified: name of person notified: Dr. Charito MD

## 2023-08-16 NOTE — PLAN OF CARE
Recommendations     1. When medically able, initiate TF regimen of Impact Peptide 1.5 @ goal rate of 45 mL/hr- provides 1620 kcals, 101 g pro, and 832 mL fluid. Propofol provides an additional 444 kcals.   - If propofol dc, increase rate to 55 mL/hr- provides 1980 kcals, 124 g pro, and 1016 mL fluid.      2. Add Vince BID x 14 days to promote wound healing. Administer via flush.      3. Recommend adding wound healing cocktail (vit C, zinc, and MVI).      4. RD following.     Goals: Will meet % EEN/EPN by next RD f/u.  Nutrition Goal Status: new  Communication of RD Recs:  (POC)    Anne-Marie Jarrett RDN,LDN

## 2023-08-16 NOTE — CONSULTS
[facility] - Neuro Critical Care  Wound Care    Patient Name:  Sam Zamora   MRN:  [id number]  Date: 8/16/2023  Diagnosis: Status epilepticus    History:     Past Medical History:   Diagnosis Date    DVT (deep venous thrombosis)     Stroke     Venous insufficiency        Social History     Socioeconomic History    Marital status:    Tobacco Use    Smoking status: Every Day     Current packs/day: 0.80     Types: Cigarettes    Tobacco comments:     3/4 pk daily   Substance and Sexual Activity    Alcohol use: No       Precautions:     Allergies as of 08/15/2023    (No Known Allergies)       [facility] Assessment Details/Treatment     Patient seen for wound care consultation - NDNQI, ankles.   Reviewed chart for this encounter.   See Flow Sheet for findings.    Pt found lying in bed w/ bedside RN present, OK for care at this time. Heel foams removed for assessment, heels intact w/o injury. Pt has intact scabs noted to L inner calf, and R outer calf above ankles. No open wounds at this time. Heel foams replaced, will order heel lift boots.    RECOMMENDATIONS:  Bedside nursing to apply heel lift boots.    Discussed POC with patient and primary nurse.   See EMR for orders & patient education.      Nursing to continue care.  Nursing to maintain pressure injury prevention interventions.           08/16/23 1051   WOCN Assessment   WOCN Total Time (mins) 10   Visit Date 08/16/23   Visit Time 1051   Consult Type New   WOCN Speciality Wound   Intervention assessed;changed;applied;coordination of care   Pressure Injury Prevention    Heel protection technique Foam dressing   Heel preventative measures Peel back dressing/boot, assess skin and reapply

## 2023-08-16 NOTE — H&P
[facility] Critical Care  Neurocritical Care  History & Physical    Admit Date: 8/15/2023  Service Date: 08/15/2023  Length of Stay: 0    Subjective:     Chief Complaint: Status epilepticus    History of Present Illness: Sam Zamora is a 63 year old male with a medical history significant for epilepsy maintained on Keppra 750mg QID, DVT on Xarelto s/p IVC filter placement who presents as a transfer from OSH for concern of status epilepticus. Patient presented to OSH after being found down at home. EMS noted 3 witnessed seizure events without return to baseline. He was intubated at OSH due to low GCS. Received 4.5g Keppra load and started on Propofol and Fentanyl. CTH showed chronic small vessel ischemic change without acute findings. CT Chest with opacification of right lower lobe, and fusiform dilation of the ascending thoracic aorta (4.2 cm).  Lactate 4.9, Ammonia 43, UDS positive for amphetamines and benzodiazepines (given per EMS).     Recommendation was made to transfer patient to [facility] for higher level of care on continuous EEG monitoring.       Past Medical History:   Diagnosis Date    DVT (deep venous thrombosis)     Stroke     Venous insufficiency      Past Surgical History:   Procedure Laterality Date    evlt      GUADALUPE FILTER PLACEMENT      REVISION TOTAL HIP ARTHROPLASTY Right       Current Facility-Administered Medications on File Prior to Encounter   Medication Dose Route Frequency Provider Last Rate Last Admin    [DISCONTINUED] GENERIC EXTERNAL MEDICATION   mcg/hr Intravenous  Provider, Generic External Data        [DISCONTINUED] propofol (DIPRIVAN) 10 mg/mL infusion  5-50 mcg/kg/min Intravenous  Provider, Generic External Data         Current Outpatient Medications on File Prior to Encounter   Medication Sig Dispense Refill    aspirin (ECOTRIN) 81 MG EC tablet Take 81 mg by mouth once daily.      levetiracetam (KEPPRA) 750 MG Tab TAKE 1 TABLET BY MOUTH 4 TIMES DAILY         Allergies: Patient has no known allergies.    No family history on file.  Social History     Tobacco Use    Smoking status: Every Day     Current packs/day: 0.80     Types: Cigarettes    Tobacco comments:     3/4 pk daily   Substance Use Topics    Alcohol use: No     Review of Systems   Unable to perform ROS: Intubated     Objective:     Vitals:    Pulse: 60  BP: 114/69  Resp: 16  SpO2: 100 %  Oxygen Concentration (%): 30  Vent Mode: A/C  Set Rate: 16 BPM  Vt Set: 420 mL  PEEP/CPAP: 5 cmH20  Peak Airway Pressure: 7.3 cmH20  Mean Airway Pressure: 7.2 cmH20  Plateau Pressure: 0 cmH20    Temp  Min: 98.2 °F (36.8 °C)  Max: 98.4 °F (36.9 °C)  Pulse  Min: 60  Max: 87  BP  Min: 93/56  Max: 122/81  Resp  Min: 16  Max: 16  SpO2  Min: 100 %  Max: 100 %  Oxygen Concentration (%)  Min: 30  Max: 50    No intake/output data recorded.            Physical Exam  Vitals and nursing note reviewed.   Constitutional:       Comments:   Intubated, mechanically ventilated  Sedated with Propofol and Fentanyl    HENT:      Head: Normocephalic and atraumatic.   Eyes:      Pupils: Pupils are equal, round, and reactive to light.   Cardiovascular:      Rate and Rhythm: Normal rate and regular rhythm.      Pulses: Normal pulses.   Pulmonary:      Comments:   Intubated and mechanically ventilated   Abdominal:      General: Abdomen is flat. There is no distension.      Tenderness: There is no abdominal tenderness.   Musculoskeletal:         General: Normal range of motion.      Cervical back: Normal range of motion. No rigidity.   Skin:     General: Skin is warm.      Comments:   BLE venous stasis changes   Neurological:      Comments:   E3VTM5  Intubated and mechanically ventilated   PERRL, no gaze deviation   Moving all extremities spontaneously antigravity        Unable to test orientation, language, memory, judgment, insight, fund of knowledge, hearing, shoulder shrug, tongue protrusion, coordination, gait due to level of  consciousness.       Today I personally reviewed pertinent medications, lines/drains/airways, imaging, cardiology results, laboratory results, microbiology results, notably:    OSH CTH without acute change (only read available)  OSH CT Chest with opacification of RLL (only read available)      Assessment/Plan:     Neuro  * Status epilepticus  63M with epilepsy maintained on Keppra 750mg QID, DVT on Xarelto s/p IVC filter placement who presents as a transfer from OSH for concern of status epilepticus. Intubated at OSH due to low GCS. Received 4.5g Keppra load and started on Propofol and Fentanyl. CTH with chronic small vessel ischemic change but without acute findings. CT Chest with opacification of right lower lobe, and fusiform dilation of the ascending thoracic aorta (4.2 cm).  Lactate 4.9, Ammonia 43, UDS positive for amphetamines and benzodiazepines (given per EMS).     - Admit to NCC  - Q1h neurochecks while in ICU  - Q1h vitals while in ICU  - HOB@30  - Continue Keppra 1500mg BID  - Propofol @20  - Fentanyl to maintain RASS 0  - MRI Brain   - Cap EEG overnight, formal hook up in AM  - SBP <160  - EKG  - OSH CXR with RLL opacification, repeat pending as no OSH imaging available for review  - ABx pending further work up, received Vanc + Cefepime x1 PTA  - Daily CMP, Mag, Phos - replete electrolytes PRN  - Lipid panel, A1c, Coags pending  - Strict I/Os  - Daily CBC, transfuse PRN  - SubQ Heparin (clarify home Xarelto given h/o DVT and IVC filter)  - PT/OT/SLP as appropriate  - CM/SW consult for dispo planning    Nonintractable epilepsy with status epilepticus  See Status epilepticus    Pulmonary  On mechanically assisted ventilation  See Status epilepticus    Aspiration pneumonia of right lower lobe  See Status epilepticus    Hematology  Chronic anticoagulation  See Status epilepticus    History of DVT in adulthood  See Status epilepticus    Presence of IVC filter  See Status  epilepticus    Endocrine  Hyperammonemia  See Status epilepticus    Palliative Care  Endotracheally intubated  See Status epilepticus    Other  Elevated lactic acid level  See Status epilepticus          The patient is being Prophylaxed for:  Venous Thromboembolism with: Chemical  Stress Ulcer with: H2B  Ventilator Pneumonia with: chlorhexidine oral care    Activity Orders          Progressive Mobility Protocol (mobilize patient to their highest level of functioning at least twice daily) starting at 08/15 2000        Full Code    Johann Mcneill MD  Neurocritical Care  [facility] - Neuro Critical Care

## 2023-08-16 NOTE — PROCEDURES
EEG prelim  14:10 p.m.-15:21 p.m.    Background:  Continuous, relatively symmetric, mixed frequency theta/delta activity with sleep architecture present.  EKG is irregular.  There is multifocal polymorphic slowing seen bilaterally.    Impression:  Encephalopathic sleep.  There are no pushbutton activations, no epileptiform discharges, and no electrographic seizures on this portion of the recording session.    Please hit the EEG button with any clinical activity concerning for seizures and describe what you see.    Full report after the completion of the study.    Vivian Rojas MD PhD [facility]  Neurology-Epilepsy  [facility].

## 2023-08-16 NOTE — PLAN OF CARE
[facility] Neuro Critical Care  Initial Discharge Assessment       Primary Care Provider: ex wife does not know     Admission Diagnosis: Status epilepticus [G40.901]    Admission Date: 8/15/2023  Expected Discharge Date: 2023    Transition of Care Barriers: Underinsured    Payor: MEDICAID / Plan: [place] BLUE [place] / Product Type: Managed Medicaid /     Extended Emergency Contact Information  Primary Emergency Contact: Nicolle Zamora  Address: [address] [place], [place] [id number] [place] [address]  Mobile Phone: [phone]  Relation: Spouse    Discharge Plan A: Rehab  Discharge Plan B: Home      [facility] [place] [address] [address] [facility]  [address] [facility] [address]  Phone: [phone] Fax: [phone]      Transferred from:  [facility]    Past Medical History:   Diagnosis Date    DVT (deep venous thrombosis)     Stroke     Venous insufficiency          CM met with patient in room for Discharge Planning Assessment.  Patient is intubated and unable to answer questions.  Phone call to Nicolle Sullivan (ex wife) [phone].  Per ex wife, the patient lives alone in a first floor apartment (below a mobile home) with 4 step(s) to enter and a rail on the right.  Patient's ex wife lives on the second floor of the raised mobile home.   Per ex wife, the patient was independent with ADLS and used no dme for ambulation.  Patient will have assistance from his ex wife upon discharge.  Per ex wife, the patient has no children and his parents are .  Ex wife stated that the patient has a sister, Azeb Riley [phone], who is aware of patient's condition.   CM informed ex wife, that patient's sister is his legal decision maker since she and patient are .  Discharge Planning Booklet left in room for patient/family and discussed.  All questions addressed.  CM will follow for needs.      Initial Assessment (most  recent)       Adult Discharge Assessment - 08/16/23 1421          Discharge Assessment    Assessment Type Discharge Planning Assessment     Confirmed/corrected address, phone number and insurance Yes     Confirmed Demographics Correct on Facesheet     Source of Information unable to respond     If unable to respond/provide information was family/caregiver contacted? Yes     Contact Name/Number Nicolle Sullivan (ex wife) [phone]     Communicated LEXII with patient/caregiver Date not available/Unable to determine     Reason For Admission Status Epilepticus     People in Home other relative(s)     Do you expect to return to your current living situation? Yes     Do you have help at home or someone to help you manage your care at home? Yes     Who are your caregiver(s) and their phone number(s)? Nicolle Sullivan (ex wife) [phone]     Prior to hospitilization cognitive status: Alert/Oriented     Current cognitive status: Coma/Sedated/Intubated     Walking or Climbing Stairs --   independent    Dressing/Bathing --   independent    Home Accessibility stairs to enter home     Number of Stairs, Main Entrance four     Stair Railings, Main Entrance railing on right side (ascending)     Home Layout Able to live on 1st floor     Equipment Currently Used at Home none     Readmission within 30 days? No     Patient currently being followed by outpatient case management? No     Do you currently have service(s) that help you manage your care at home? No     Do you take prescription medications? Yes     Do you have prescription coverage? Yes     Coverage Healthy Blue     Do you have any problems affording any of your prescribed medications? No     Is the patient taking medications as prescribed? --   shasta    Who is going to help you get home at discharge? Nicolle Sullivan (ex wife) [phone]. Azeb Riley (sister) [phone]     How do you get to doctors appointments? family or friend will provide     Are you on dialysis? No      Do you take coumadin? No     DME Needed Upon Discharge  other (see comments)   tbd    Transition of Care Barriers Underinsured     Discharge Plan A Rehab     Discharge Plan B Home        Physical Activity    On average, how many days per week do you engage in moderate to strenuous exercise (like a brisk walk)? 0 days     On average, how many minutes do you engage in exercise at this level? 0 min        Financial Resource Strain    How hard is it for you to pay for the very basics like food, housing, medical care, and heating? Very hard        Housing Stability    In the last 12 months, was there a time when you were not able to pay the mortgage or rent on time? Yes     In the last 12 months, how many places have you lived? 1     In the last 12 months, was there a time when you did not have a steady place to sleep or slept in a shelter (including now)? Yes        Transportation Needs    In the past 12 months, has lack of transportation kept you from medical appointments or from getting medications? No     In the past 12 months, has lack of transportation kept you from meetings, work, or from getting things needed for daily living? No        Food Insecurity    Within the past 12 months, you worried that your food would run out before you got the money to buy more. Often true     Within the past 12 months, the food you bought just didn't last and you didn't have money to get more. Often true        Stress    Do you feel stress - tense, restless, nervous, or anxious, or unable to sleep at night because your mind is troubled all the time - these days? --   shasta       Social Connections    In a typical week, how many times do you talk on the phone with family, friends, or neighbors? Twice a week     How often do you get together with friends or relatives? Never     How often do you attend Buddhist or Protestant services? Never     Do you belong to any clubs or organizations such as Buddhist groups, unions, fraternal or  athletic groups, or school groups? No     How often do you attend meetings of the clubs or organizations you belong to? Never     Are you , , , , never , or living with a partner?         Alcohol Use    Q1: How often do you have a drink containing alcohol? Never     Q2: How many drinks containing alcohol do you have on a typical day when you are drinking? Patient does not drink     Q3: How often do you have six or more drinks on one occasion? Never        OTHER    Name(s) of People in Home Nicolle Sullivan (ex wife) [phone]                   Ailyn Nagel RN, CCRN-K, [facility]  Neuro-Critical Care   [phone]

## 2023-08-16 NOTE — PROCEDURES
[facility] EEG/VIDEO MONITORING REPORT  Sam Zamora  [id number]  1960    DATE OF SERVICE:  08/16/2023  DATE OF ADMISSION: 8/15/2023  7:33 PM    ADMITTING/REQUESTING PROVIDER: Julito King MD    REASON FOR CONSULT:  63-year-old man with an established diagnosis of epilepsy on levetiracetam admitted with 3 episodes of decreased responsiveness with abnormal movements followed by prolonged decreased responsiveness.  Electrode cap EEG placed to evaluate for evidence of status epilepticus.    METHODOLOGY   Electroencephalographic (EEG) recording is with electrodes placed according to the International 10-20 placement system.  Thirty two (32) channels of digital signal (sampling rate of 512/sec) including T1 and T2 was simultaneously recorded from the scalp and may include  EKG, EMG, and/or eye monitors.  Recording band pass was 0.1 to 512 hz.  Digital video recording of the patient is simultaneously recorded with the EEG.  The patient is instructed report clinical symptoms which may occur during the recording session.  EEG and video recording is stored and archived in digital format.  Activation procedures which include photic stimulation, hyperventilation and instructing patients to perform simple task are done in selected patients.   The EEG is displayed on a monitor screen and can be reviewed using different montages.  Computer assisted analysis is employed to detect spike and electrographic seizure activity.   The entire record is submitted for computer analysis.  The entire recording is visually reviewed and the times identified by computer analysis as being spikes or seizures are reviewed again.  Compresses spectral analysis (CSA) is also performed on the activity recorded from each individual channel.  This is displayed as a power display of frequencies from 0 to 30 Hz over time.   The CSA is reviewed looking for asymmetries in power between homologous areas of the scalp and then compared with the original EEG  recording.     Codasip software is also utilized in the review of this study.  This software suite analyzes the EEG recording in multiple domains.  Coherence and rhythmicity is computed to identify EEG sections which may contain organized seizures.  Each channel undergoes analysis to detect presence of spike and sharp waves which have special and morphological characteristic of epileptic activity.  The routine EEG recording is converted from spacial into frequency domain.  This is then displayed comparing homologous areas to identify areas of significant asymmetry.  Algorithm to identify non-cortically generated artifact is used to separate eye movement, EMG and other artifact from the EEG.      RECORDING TIMES  Start on 08/16/2023 at 01:19 a.m.  Stop on 08/16/2023 at 09:59 a.m.-> End of the Recording Session  A total of 8 hours and 38 minutes of EEG recording is obtained.    EEG FINDINGS  Background activity:   Within the limitation of a significant amount of movement/lead artifact on an electrode cap EEG, the background is continuous mixed frequency activity with slightly more prominent slowing seen over the right hemisphere.  In the same area, there are occasional sporadic sharp waves phase reversing at F4.    There are no pushbutton activations.    Sleep:  There is cycling between wakefulness and sleep    Activation procedures:   Hyperventilation is not performed  Photic stimulation is not performed    Cardiac Monitor:   Electrode caps do not have EKG capabilities    Impression:   Within the limitation of a significant amount of lead/movement artifact, this is an abnormal electrode cap EEG monitoring study because of slowing with poorly formed discharges in the right anterior quadrant suggesting an area of focal cortical dysfunction/irritation and a potential seizure focus in this region.  There are no pushbutton activations or electrographic seizures.  Depending on the clinical scenario, consider additional  monitoring with standard scalp electrodes.    Vivian Rojas MD PhD [facility]  Neurology-Epilepsy  [facility].

## 2023-08-16 NOTE — PROGRESS NOTES
[facility] Critical Care  Neurocritical Care  Progress Note    Admit Date: 8/15/2023  Service Date: 08/16/2023  Length of Stay: 1    Subjective:     Chief Complaint: Status epilepticus    History of Present Illness: Sam Zamora is a 63 year old male with a medical history significant for epilepsy maintained on Keppra 750mg QID, DVT on Xarelto s/p IVC filter placement who presents as a transfer from OSH for concern of status epilepticus. Patient presented to OSH after being found down at home. EMS noted 3 witnessed seizure events without return to baseline. He was intubated at OSH due to low GCS. Received 4.5g Keppra load and started on Propofol and Fentanyl. CTH showed chronic small vessel ischemic change without acute findings. CT Chest with opacification of right lower lobe, and fusiform dilation of the ascending thoracic aorta (4.2 cm).  Lactate 4.9, Ammonia 43, UDS positive for amphetamines and benzodiazepines (given per EMS).     Recommendation was made to transfer patient to [facility] for higher level of care on continuous EEG monitoring.       Hospital Course: No notes on file    Interval History:  Transferred from OSH yesterday with concern for status. NAEON, no epileptiform activity on EEG thus far. Loaded with 4.5g Keppra yesterday, maintenance with 1500mg BID. Lactate trending down. Cap EEG currently on, will obtain formal EEG today. Sedated on propofol and fentanyl. Will spontaneously move all limbs when woken.     Review of Systems  Unable to obtain a complete ROS due to level of consciousness.  Objective:     Vitals:  Temp: 97.6 °F (36.4 °C)  Pulse: 60  Rhythm: normal sinus rhythm  BP: (!) 88/65  MAP (mmHg): 67  Resp: 17  SpO2: 99 %  Oxygen Concentration (%): 30  Vent Mode: A/C  Set Rate: 16 BPM  Vt Set: 420 mL  PEEP/CPAP: 5 cmH20  Peak Airway Pressure: 18 cmH20  Mean Airway Pressure: 8.6 cmH20  Plateau Pressure: 0 cmH20    Temp  Min: 37.4 °F (3 °C)  Max: 98.4 °F (36.9 °C)  Pulse  Min: 54  Max:  87  BP  Min: 88/65  Max: 150/72  MAP (mmHg)  Min: 67  Max: 100  Resp  Min: 14  Max: 17  SpO2  Min: 92 %  Max: 100 %  Oxygen Concentration (%)  Min: 30  Max: 50    08/15 0701 - 08/16 0700  In: 461.8 [P.O.:200; I.V.:261.8]  Out: 250             Physical Exam  HENT:      Head:      Comments: Cap EEG     Nose:      Comments: NGT   Eyes:      Extraocular Movements: Extraocular movements intact.   Pulmonary:      Comments: Intubated, on vent  Neurological:      Comments: E3VTM5  Sedated on propofol and fentanyl  Spontaneously moves all limbs when woken, does not follow commands                Medications:  Continuousfentanyl, Last Rate: 100 mcg/hr (08/16/23 1105)  propofol, Last Rate: 35 mcg/kg/min (08/16/23 1105)    Scheduledaspirin, 81 mg, Daily  enoxparin, 40 mg, Q24H (prophylaxis, 1700)  famotidine (PF), 20 mg, Q12H  levETIRAcetam (Keppra) IV (PEDS and ADULTS), 1,500 mg, Q12H  mupirocin, , BID  senna-docusate 8.6-50 mg, 1 tablet, Daily    PRNmagnesium oxide, 800 mg, PRN  magnesium oxide, 800 mg, PRN  potassium bicarbonate, 35 mEq, PRN  potassium bicarbonate, 50 mEq, PRN  potassium bicarbonate, 60 mEq, PRN  potassium, sodium phosphates, 2 packet, PRN  potassium, sodium phosphates, 2 packet, PRN  potassium, sodium phosphates, 2 packet, PRN  sodium chloride 0.9%, 10 mL, PRN      Today I personally reviewed pertinent medications, lines/drains/airways, imaging, laboratory results, notably:    Diet  No diet orders on file  No diet orders on file          Assessment/Plan:     Neuro  * Status epilepticus  63M with epilepsy maintained on Keppra 750mg QID, DVT on Xarelto s/p IVC filter placement who presents as a transfer from OSH for concern of status epilepticus. Intubated at OSH due to low GCS. Received 4.5g Keppra load and started on Propofol and Fentanyl. CTH with chronic small vessel ischemic change but without acute findings. CT Chest with opacification of right lower lobe, and fusiform dilation of the ascending thoracic  aorta (4.2 cm).  Initial Lactate 4.9, Ammonia 43, UDS positive for amphetamines and benzodiazepines (given per EMS).     Neuro   - Admit to NCC  - Q1h neurochecks while in ICU   - moves all limbs spontaneously when woken, does not follow commands  - Q1h vitals while in ICU  - HOB@30  - Continue Keppra 1500mg BID  - MRI Brain w/o acute intracranial pathology   - Cap EEG overnight, formal hook up today  - Propofol @35  - Fentanyl to maintain RASS 0  - lactate down-trending     Cards  - SBP <160  - EKG  - Lipid panel, A1c pending  - Coags wnl     Resp  - intubated and sedated, mechanically ventilated  - OSH CXR with RLL opacification, repeat CXR today wnl   - ABG ordered pH 7.364, pCO2 45.4, pO2 130, HCO3 25.2    Renal  - Strict I/Os  - external catheter in place   - bladder scan q6h     FEN/GI  - Daily CMP, Mag, Phos - replete electrolytes PRN  - start trickle feeds today, consult nutrition     Endo  - TSH 25   - repeat TSH elevated   - T3 and T4 wnl     H/O  - Daily CBC, transfuse PRN  - ASA daily   - Lovenox DVT ppx (will clarify home Xarelto given h/o DVT and IVC filter)    ID  - ABx pending further work up, received Vanc + Cefepime x1 PTA  - afebrile, WCC wnl     Other  - PT/OT/SLP as appropriate  - CM/SW consult for dispo planning    Nonintractable epilepsy with status epilepticus  See Status epilepticus    Pulmonary  On mechanically assisted ventilation  See Status epilepticus    Aspiration pneumonia of right lower lobe  Repeat CXR unremarkable   See Status epilepticus    Hematology  Chronic anticoagulation  See Status epilepticus    History of DVT in adulthood  See Status epilepticus    Presence of IVC filter  See Status epilepticus    Endocrine  Hyperammonemia  See Status epilepticus    Palliative Care  Endotracheally intubated  See Status epilepticus    Other  Elevated lactic acid level  Resolving   See Status epilepticus          The patient is being Prophylaxed for:  Venous Thromboembolism with:  Chemical  Stress Ulcer with: H2B  Ventilator Pneumonia with: chlorhexidine oral care    Activity Orders          Turn patient every 2 hours starting at 08/16 0800    Progressive Mobility Protocol (mobilize patient to their highest level of functioning at least twice daily) starting at 08/15 2000        Full Code    Marybeth Slater MD  Neurocritical Care  [facility] - Neuro Critical Care

## 2023-08-16 NOTE — SUBJECTIVE & OBJECTIVE
Interval History:  Transferred from OSH yesterday with concern for status. NAEON, no epileptiform activity on EEG thus far. Loaded with 4.5g Keppra yesterday, maintenance with 1500mg BID. Lactate trending down. Cap EEG currently on, will obtain formal EEG today. Sedated on propofol and fentanyl. Will spontaneously move all limbs when woken.     Review of Systems  Unable to obtain a complete ROS due to level of consciousness.  Objective:     Vitals:  Temp: 97.6 °F (36.4 °C)  Pulse: 60  Rhythm: normal sinus rhythm  BP: (!) 88/65  MAP (mmHg): 67  Resp: 17  SpO2: 99 %  Oxygen Concentration (%): 30  Vent Mode: A/C  Set Rate: 16 BPM  Vt Set: 420 mL  PEEP/CPAP: 5 cmH20  Peak Airway Pressure: 18 cmH20  Mean Airway Pressure: 8.6 cmH20  Plateau Pressure: 0 cmH20    Temp  Min: 37.4 °F (3 °C)  Max: 98.4 °F (36.9 °C)  Pulse  Min: 54  Max: 87  BP  Min: 88/65  Max: 150/72  MAP (mmHg)  Min: 67  Max: 100  Resp  Min: 14  Max: 17  SpO2  Min: 92 %  Max: 100 %  Oxygen Concentration (%)  Min: 30  Max: 50    08/15 0701 - 08/16 0700  In: 461.8 [P.O.:200; I.V.:261.8]  Out: 250             Physical Exam  HENT:      Head:      Comments: Cap EEG     Nose:      Comments: NGT   Eyes:      Extraocular Movements: Extraocular movements intact.   Pulmonary:      Comments: Intubated, on vent  Neurological:      Comments: E3VTM5  Sedated on propofol and fentanyl  Spontaneously moves all limbs when woken, does not follow commands                Medications:  Continuousfentanyl, Last Rate: 100 mcg/hr (08/16/23 1105)  propofol, Last Rate: 35 mcg/kg/min (08/16/23 1105)    Scheduledaspirin, 81 mg, Daily  enoxparin, 40 mg, Q24H (prophylaxis, 1700)  famotidine (PF), 20 mg, Q12H  levETIRAcetam (Keppra) IV (PEDS and ADULTS), 1,500 mg, Q12H  mupirocin, , BID  senna-docusate 8.6-50 mg, 1 tablet, Daily    PRNmagnesium oxide, 800 mg, PRN  magnesium oxide, 800 mg, PRN  potassium bicarbonate, 35 mEq, PRN  potassium bicarbonate, 50 mEq, PRN  potassium bicarbonate, 60  mEq, PRN  potassium, sodium phosphates, 2 packet, PRN  potassium, sodium phosphates, 2 packet, PRN  potassium, sodium phosphates, 2 packet, PRN  sodium chloride 0.9%, 10 mL, PRN      Today I personally reviewed pertinent medications, lines/drains/airways, imaging, laboratory results, notably:    Diet  No diet orders on file  No diet orders on file

## 2023-08-16 NOTE — PLAN OF CARE
[facility] Care Plan    POC reviewed with Sam CAT Sera and family at 0300. Pt verbalized understanding. Questions and concerns addressed. No acute events overnight. Pt progressing toward goals. Will continue to monitor. See below and flowsheets for full assessment and VS info.     -no witnessed seizures O/N; MRI Head completed with no adverse events, EEG cap in place  -nontitrating prop @ 35  -titrating fent   -wounds noted to bilateral lower extremities(ankles and feet), wound care consulted  -first replacement doses of potassium and phos given      Is this a stroke patient? no    Neuro:  [place] Coma Scale  Best Eye Response: 4-->(E4) spontaneous  Best Motor Response: 5-->(M5) localizes pain  Best Verbal Response: 1-->(V1) none  Prabha Coma Scale Score: 10  Assessment Qualifiers: patient chemically sedated or paralyzed, patient intubated  Pupil PERRLA: yes     24hr Temp:  [37.4 °F (3 °C)-98.4 °F (36.9 °C)]     CV:   Rhythm: normal sinus rhythm  BP goals:   SBP < 180  MAP > 65    Resp:      Vent Mode: A/C  Set Rate: 16 BPM  Oxygen Concentration (%): 30  Vt Set: 420 mL  PEEP/CPAP: 5 cmH20    Plan: status epilepticus    GI/:                Intake/Output Summary (Last 24 hours) at 8/16/2023 0528  Last data filed at 8/16/2023 0401  Gross per 24 hour   Intake 208.23 ml   Output --   Net 208.23 ml          Labs/Accuchecks:  Recent Labs   Lab 08/16/23  0002   WBC 6.40   RBC 3.98*   HGB 11.6*   HCT 34.3*         Recent Labs   Lab 08/16/23  0005   *   K 3.2*   CO2 23      BUN 13   CREATININE 1.0   ALKPHOS 53*   ALT 7*   AST 24   BILITOT 0.7      Recent Labs   Lab 08/15/23  1145   INR 1.2   APTT 29.3      Recent Labs   Lab 08/15/23  1145   CPK 96       Electrolytes: Electrolytes replaced  Accuchecks: none    Gtts:   fentanyl 100 mcg/hr (08/16/23 0401)    propofol 35 mcg/kg/min (08/16/23 0401)       LDA/Wounds:  Lines/Drains/Airways       Airway  Duration                  Airway - Non-Surgical --  days                  Wounds: Yes  Wound care consulted: Yes

## 2023-08-16 NOTE — CONSULTS
[facility]  Adult Nutrition  Consult Note    SUMMARY     Recommendations    1. When medically able, initiate TF regimen of Impact Peptide 1.5 @ goal rate of 45 mL/hr- provides 1620 kcals, 101 g pro, and 832 mL fluid. Propofol provides an additional 444 kcals.   - If propofol dc, increase rate to 55 mL/hr- provides 1980 kcals, 124 g pro, and 1016 mL fluid.     2. Add Vince BID x 14 days to promote wound healing. Administer via flush.     3. Recommend adding wound healing cocktail (vit C, zinc, and MVI).     4. RD following.    Goals: Will meet % EEN/EPN by next RD f/u.  Nutrition Goal Status: new  Communication of RD Recs:  (POC)    Assessment and Plan    Nutrition Problem  Inadequate oral intake     Related to (etiology):   Inability to consume sufficient needs     Signs and Symptoms (as evidenced by):   NPO status      Interventions/Recommendations (treatment strategy):  Collaboration of nutrition care with other providers   EN     Nutrition Diagnosis Status:   New    Reason for Assessment    Reason For Assessment: consult  Diagnosis:  (Status epilepticus)  Relevant Medical History: -  Interdisciplinary Rounds: did not attend  General Information Comments: RD consulted for intubated with OGT. Unable to speak with pt, unsure intake PTA. NPO with no TF regimen running at this time. Wt on 1/4/23 was 184# and # - indicates 4% wt loss x 8 months (not significant). Not appropriate for NFPE at this time- will complete at next RD f/u if warranted. Noted wounds present (BLEs- ankles and feet). LBM 8/14.  Nutrition Discharge Planning: Pending clinical course    Nutrition Risk Screen    Nutrition Risk Screen: difficulty chewing/swallowing, large or nonhealing wound, burn or pressure injury    Nutrition/Diet History    Food Allergies: NKFA  Factors Affecting Nutritional Intake: NPO, on mechanical ventilation, difficulty/impaired swallowing    Anthropometrics    Temp: 97.5 °F (36.4  "°C)  Height: 6' 2" (188 cm)  Height (inches): 74 in  Weight: 80 kg (176 lb 5.9 oz)  Weight (lb): 176.37 lb  Ideal Body Weight (IBW), Male: 190 lb  % Ideal Body Weight, Male (lb): 92.83 %  BMI (Calculated): 22.6  BMI Grade: 18.5-24.9 - normal    Lab/Procedures/Meds    Pertinent Labs Reviewed: reviewed  Pertinent Labs Comments: Sodium 133, Potassium 3.2, Calcium 7.9, Phos 2.2, Al Phos 53, Albumin 3.0, ALT 7  Pertinent Medications Reviewed: reviewed  Pertinent Medications Comments: aspirin, famotidine, fentanyl, propofol    Estimated/Assessed Needs    Weight Used For Calorie Calculations: 80 kg (176 lb 5.9 oz)  Energy Calorie Requirements (kcal): 6395-6305 kcals  Energy Need Method: Kcal/kg (25-30 kcal/kg)  Protein Requirements:  g (1.2-1.5 g/kg)  Weight Used For Protein Calculations: 80 kg (176 lb 5.9 oz)  Fluid Requirements (mL): 1 mL/kcal or fluid per MD  Estimated Fluid Requirement Method: RDA Method  RDA Method (mL): 2000    Nutrition Prescription Ordered    Current Diet Order: NPO    Evaluation of Received Nutrient/Fluid Intake    Other Calories (kcal): 444 (propofol @ 16.8 mL/hr)  % Kcal Needs: 22%  I/O: +240.4 mL since admit  Energy Calories Required: not meeting needs  % Intake of Estimated Energy Needs: 22%  % Meal Intake: NPO    Nutrition Risk    Level of Risk/Frequency of Follow-up:  (1 time/week)     Monitor and Evaluation    Food and Nutrient Intake: enteral nutrition intake  Food and Nutrient Adminstration: enteral and parenteral nutrition administration  Knowledge/Beliefs/Attitudes: food and nutrition knowledge/skill, beliefs and attitudes  Physical Activity and Function: nutrition-related ADLs and IADLs  Anthropometric Measurements: weight change, body mass index, weight, height/length  Biochemical Data, Medical Tests and Procedures: inflammatory profile, lipid profile, glucose/endocrine profile, gastrointestinal profile, electrolyte and renal panel  Nutrition-Focused Physical Findings: overall " appearance     Nutrition Follow-Up    RD Follow-up?: Yes    Anne-Marie Colon RDN,LDN

## 2023-08-16 NOTE — HPI
Sam Zamora is a 63 year old male with a medical history significant for epilepsy maintained on Keppra 750mg QID, DVT on Xarelto s/p IVC filter placement who presents as a transfer from OSH for concern of status epilepticus. Patient presented to OSH after being found down at home. EMS noted 3 witnessed seizure events without return to baseline. He was intubated at OSH due to low GCS. Received 4.5g Keppra load and started on Propofol and Fentanyl. CTH showed chronic small vessel ischemic change without acute findings. CT Chest with opacification of right lower lobe, and fusiform dilation of the ascending thoracic aorta (4.2 cm).  Lactate 4.9, Ammonia 43, UDS positive for amphetamines and benzodiazepines (given per EMS).     Recommendation was made to transfer patient to [facility] for higher level of care on continuous EEG monitoring.

## 2023-08-16 NOTE — PLAN OF CARE
Attempted to meet with patient and or family in room for discharge planning assessment. Pt unable to answer questions 2/2 patient is intubated.  Phone call to patient's wife, Nicolle Zamora [phone].  No answer.  Msg left on voicemail for wife to call CM back.   No family present in room.  Will revisit..    Ailyn Nagel RN, CCRN-K, [facility]  Neuro-Critical Care   [phone]

## 2023-08-16 NOTE — PLAN OF CARE
[facility] Care Plan    POC reviewed with Sam Zamora and family at 1400. Pt verbalized understanding. Questions and concerns addressed. No acute events today. Pt progressing toward goals. Will continue to monitor. See below and flowsheets for full assessment and VS info.   EEG in place  Propofol at 35  Fentanyl at 75  Feeding started  restraint            Is this a stroke patient? no    Neuro:  Prabha Coma Scale  Best Eye Response: 4-->(E4) spontaneous  Best Motor Response: 5-->(M5) localizes pain  Best Verbal Response: 1-->(V1) none  [place] Coma Scale Score: 10  Assessment Qualifiers: patient chemically sedated or paralyzed  Pupil PERRLA: yes     24 hr Temp:  [37.4 °F (3 °C)-98.4 °F (36.9 °C)]     CV:   Rhythm: normal sinus rhythm  BP goals:   SBP < 160  MAP > 65    Resp:      Vent Mode: A/C  Set Rate: 16 BPM  Oxygen Concentration (%): 30  Vt Set: 420 mL  PEEP/CPAP: 5 cmH20    Plan: wean to extubate    GI/:     Diet/Nutrition Received: NPO  Last Bowel Movement: 08/14/23  Voiding Characteristics: external catheter    Intake/Output Summary (Last 24 hours) at 8/16/2023 1648  Last data filed at 8/16/2023 1605  Gross per 24 hour   Intake 722.98 ml   Output 700 ml   Net 22.98 ml          Labs/Accuchecks:  Recent Labs   Lab 08/16/23  0002   WBC 6.40   RBC 3.98*   HGB 11.6*   HCT 34.3*         Recent Labs   Lab 08/16/23  0005   *   K 3.2*   CO2 23      BUN 13   CREATININE 1.0   ALKPHOS 53*   ALT 7*   AST 24   BILITOT 0.7      Recent Labs   Lab 08/15/23  1145   INR 1.2   APTT 29.3      Recent Labs   Lab 08/15/23  1145   CPK 96       Electrolytes: Electrolytes replaced  Accuchecks: none    Gtts:   fentanyl 75 mcg/hr (08/16/23 1605)    propofol 35 mcg/kg/min (08/16/23 1605)       LDA/Wounds:  Lines/Drains/Airways       Drain  Duration             Male External Urinary Catheter 08/16/23 0723 Medium <1 day              Airway  Duration                  Airway - Non-Surgical -- days               Peripheral Intravenous Line  Duration                  Peripheral IV - Single Lumen 08/15/23 1300 20 G Anterior;Distal;Left Forearm 1 day         Peripheral IV - Single Lumen 08/15/23 1500 18 G Anterior;Right Forearm 1 day         Peripheral IV - Single Lumen 08/15/23 1500 20 G Left Antecubital 1 day                  Wounds: yes  Wound care consulted: Yes

## 2023-08-16 NOTE — ASSESSMENT & PLAN NOTE
63M with epilepsy maintained on Keppra 750mg QID, DVT on Xarelto s/p IVC filter placement who presents as a transfer from OSH for concern of status epilepticus. Intubated at OSH due to low GCS. Received 4.5g Keppra load and started on Propofol and Fentanyl. CTH with chronic small vessel ischemic change but without acute findings. CT Chest with opacification of right lower lobe, and fusiform dilation of the ascending thoracic aorta (4.2 cm).  Lactate 4.9, Ammonia 43, UDS positive for amphetamines and benzodiazepines (given per EMS).     - Admit to NCC  - Q1h neurochecks while in ICU  - Q1h vitals while in ICU  - HOB@30  - Continue Keppra 1500mg BID  - Propofol @20  - Fentanyl to maintain RASS 0  - MRI Brain   - Cap EEG overnight, formal hook up in AM  - SBP <160  - EKG  - OSH CXR with RLL opacification, repeat pending as no OSH imaging available for review  - ABx pending further work up, received Vanc + Cefepime x1 PTA  - Daily CMP, Mag, Phos - replete electrolytes PRN  - Lipid panel, A1c, Coags pending  - Strict I/Os  - Daily CBC, transfuse PRN  - SubQ Heparin (clarify home Xarelto given h/o DVT and IVC filter)  - PT/OT/SLP as appropriate  - CM/SW consult for dispo planning

## 2023-08-17 LAB
ALBUMIN SERPL BCP-MCNC: 2.9 G/DL (ref 3.5–5.2)
ALLENS TEST: ABNORMAL
ALP SERPL-CCNC: 58 U/L (ref 55–135)
ALT SERPL W/O P-5'-P-CCNC: 8 U/L (ref 10–44)
ANION GAP SERPL CALC-SCNC: 7 MMOL/L (ref 8–16)
AST SERPL-CCNC: 29 U/L (ref 10–40)
BASOPHILS # BLD AUTO: 0.02 K/UL (ref 0–0.2)
BASOPHILS NFR BLD: 0.3 % (ref 0–1.9)
BILIRUB SERPL-MCNC: 0.8 MG/DL (ref 0.1–1)
BUN SERPL-MCNC: 15 MG/DL (ref 8–23)
CALCIUM SERPL-MCNC: 8.1 MG/DL (ref 8.7–10.5)
CHLORIDE SERPL-SCNC: 104 MMOL/L (ref 95–110)
CO2 SERPL-SCNC: 26 MMOL/L (ref 23–29)
CREAT SERPL-MCNC: 1.1 MG/DL (ref 0.5–1.4)
DELSYS: ABNORMAL
DIFFERENTIAL METHOD: ABNORMAL
EOSINOPHIL # BLD AUTO: 0.1 K/UL (ref 0–0.5)
EOSINOPHIL NFR BLD: 2 % (ref 0–8)
ERYTHROCYTE [DISTWIDTH] IN BLOOD BY AUTOMATED COUNT: 13.6 % (ref 11.5–14.5)
ERYTHROCYTE [SEDIMENTATION RATE] IN BLOOD BY WESTERGREN METHOD: 16 MM/H
EST. GFR  (NO RACE VARIABLE): >60 ML/MIN/1.73 M^2
FIO2: 30
GLUCOSE SERPL-MCNC: 86 MG/DL (ref 70–110)
HCO3 UR-SCNC: 26.6 MMOL/L (ref 24–28)
HCT VFR BLD AUTO: 37.1 % (ref 40–54)
HGB BLD-MCNC: 12.2 G/DL (ref 14–18)
IMM GRANULOCYTES # BLD AUTO: 0.02 K/UL (ref 0–0.04)
IMM GRANULOCYTES NFR BLD AUTO: 0.3 % (ref 0–0.5)
LYMPHOCYTES # BLD AUTO: 0.9 K/UL (ref 1–4.8)
LYMPHOCYTES NFR BLD: 14.6 % (ref 18–48)
MAGNESIUM SERPL-MCNC: 2 MG/DL (ref 1.6–2.6)
MCH RBC QN AUTO: 28.1 PG (ref 27–31)
MCHC RBC AUTO-ENTMCNC: 32.9 G/DL (ref 32–36)
MCV RBC AUTO: 86 FL (ref 82–98)
MODE: ABNORMAL
MONOCYTES # BLD AUTO: 0.6 K/UL (ref 0.3–1)
MONOCYTES NFR BLD: 10.7 % (ref 4–15)
NEUTROPHILS # BLD AUTO: 4.3 K/UL (ref 1.8–7.7)
NEUTROPHILS NFR BLD: 72.1 % (ref 38–73)
NRBC BLD-RTO: 0 /100 WBC
PCO2 BLDA: 49.4 MMHG (ref 35–45)
PEEP: 5
PH SMN: 7.34 [PH] (ref 7.35–7.45)
PHOSPHATE SERPL-MCNC: 3.8 MG/DL (ref 2.7–4.5)
PLATELET # BLD AUTO: 146 K/UL (ref 150–450)
PMV BLD AUTO: 10.6 FL (ref 9.2–12.9)
PO2 BLDA: 124 MMHG (ref 80–100)
POC BE: 1 MMOL/L
POC SATURATED O2: 99 % (ref 95–100)
POC TCO2: 28 MMOL/L (ref 23–27)
POCT GLUCOSE: 90 MG/DL (ref 70–110)
POTASSIUM SERPL-SCNC: 3.5 MMOL/L (ref 3.5–5.1)
PROT SERPL-MCNC: 6.5 G/DL (ref 6–8.4)
RBC # BLD AUTO: 4.34 M/UL (ref 4.6–6.2)
SAMPLE: ABNORMAL
SITE: ABNORMAL
SODIUM SERPL-SCNC: 137 MMOL/L (ref 136–145)
TRIGL SERPL-MCNC: 91 MG/DL (ref 30–150)
VT: 420
WBC # BLD AUTO: 5.96 K/UL (ref 3.9–12.7)

## 2023-08-17 PROCEDURE — 84100 ASSAY OF PHOSPHORUS: CPT | Performed by: STUDENT IN AN ORGANIZED HEALTH CARE EDUCATION/TRAINING PROGRAM

## 2023-08-17 PROCEDURE — 85025 COMPLETE CBC W/AUTO DIFF WBC: CPT | Performed by: INTERNAL MEDICINE

## 2023-08-17 PROCEDURE — 63600175 PHARM REV CODE 636 W HCPCS: Performed by: NURSE PRACTITIONER

## 2023-08-17 PROCEDURE — 83735 ASSAY OF MAGNESIUM: CPT | Performed by: STUDENT IN AN ORGANIZED HEALTH CARE EDUCATION/TRAINING PROGRAM

## 2023-08-17 PROCEDURE — 25000003 PHARM REV CODE 250: Performed by: STUDENT IN AN ORGANIZED HEALTH CARE EDUCATION/TRAINING PROGRAM

## 2023-08-17 PROCEDURE — 99900017 HC EXTUBATION W/PARAMETERS (STAT)

## 2023-08-17 PROCEDURE — 95718 PR EEG, W/VIDEO, CONT RECORD, I&R, 2-12 HRS: ICD-10-PCS | Mod: ,,, | Performed by: PSYCHIATRY & NEUROLOGY

## 2023-08-17 PROCEDURE — 51798 US URINE CAPACITY MEASURE: CPT

## 2023-08-17 PROCEDURE — 99233 PR SUBSEQUENT HOSPITAL CARE,LEVL III: ICD-10-PCS | Mod: FS,,, | Performed by: NURSE PRACTITIONER

## 2023-08-17 PROCEDURE — 84478 ASSAY OF TRIGLYCERIDES: CPT | Performed by: INTERNAL MEDICINE

## 2023-08-17 PROCEDURE — 25000003 PHARM REV CODE 250: Performed by: NURSE PRACTITIONER

## 2023-08-17 PROCEDURE — 20000000 HC ICU ROOM

## 2023-08-17 PROCEDURE — 94150 VITAL CAPACITY TEST: CPT

## 2023-08-17 PROCEDURE — 94010 BREATHING CAPACITY TEST: CPT

## 2023-08-17 PROCEDURE — 99233 SBSQ HOSP IP/OBS HIGH 50: CPT | Mod: FS,,, | Performed by: NURSE PRACTITIONER

## 2023-08-17 PROCEDURE — 94003 VENT MGMT INPAT SUBQ DAY: CPT

## 2023-08-17 PROCEDURE — 95718 EEG PHYS/QHP 2-12 HR W/VEEG: CPT | Mod: ,,, | Performed by: PSYCHIATRY & NEUROLOGY

## 2023-08-17 PROCEDURE — 63600175 PHARM REV CODE 636 W HCPCS: Performed by: STUDENT IN AN ORGANIZED HEALTH CARE EDUCATION/TRAINING PROGRAM

## 2023-08-17 PROCEDURE — 25000003 PHARM REV CODE 250: Performed by: PHYSICIAN ASSISTANT

## 2023-08-17 PROCEDURE — 99900035 HC TECH TIME PER 15 MIN (STAT)

## 2023-08-17 PROCEDURE — 37799 UNLISTED PX VASCULAR SURGERY: CPT

## 2023-08-17 PROCEDURE — 80053 COMPREHEN METABOLIC PANEL: CPT | Performed by: STUDENT IN AN ORGANIZED HEALTH CARE EDUCATION/TRAINING PROGRAM

## 2023-08-17 PROCEDURE — 94761 N-INVAS EAR/PLS OXIMETRY MLT: CPT

## 2023-08-17 PROCEDURE — 63600175 PHARM REV CODE 636 W HCPCS

## 2023-08-17 PROCEDURE — 27100171 HC OXYGEN HIGH FLOW UP TO 24 HOURS

## 2023-08-17 PROCEDURE — 82803 BLOOD GASES ANY COMBINATION: CPT

## 2023-08-17 PROCEDURE — 27000221 HC OXYGEN, UP TO 24 HOURS

## 2023-08-17 PROCEDURE — 51701 INSERT BLADDER CATHETER: CPT

## 2023-08-17 PROCEDURE — 82800 BLOOD PH: CPT

## 2023-08-17 PROCEDURE — 27200966 HC CLOSED SUCTION SYSTEM

## 2023-08-17 PROCEDURE — 99900026 HC AIRWAY MAINTENANCE (STAT)

## 2023-08-17 RX ORDER — ACETAMINOPHEN 325 MG/1
650 TABLET ORAL EVERY 6 HOURS PRN
Status: DISCONTINUED | OUTPATIENT
Start: 2023-08-17 | End: 2023-08-18

## 2023-08-17 RX ORDER — HYDRALAZINE HYDROCHLORIDE 20 MG/ML
10 INJECTION INTRAMUSCULAR; INTRAVENOUS EVERY 6 HOURS PRN
Status: DISCONTINUED | OUTPATIENT
Start: 2023-08-17 | End: 2023-08-19

## 2023-08-17 RX ORDER — PROPOFOL 10 MG/ML
0-50 INJECTION, EMULSION INTRAVENOUS CONTINUOUS
Status: DISCONTINUED | OUTPATIENT
Start: 2023-08-17 | End: 2023-08-17

## 2023-08-17 RX ORDER — LABETALOL HCL 20 MG/4 ML
10 SYRINGE (ML) INTRAVENOUS EVERY 6 HOURS PRN
Status: DISCONTINUED | OUTPATIENT
Start: 2023-08-17 | End: 2023-08-19

## 2023-08-17 RX ORDER — ACETAMINOPHEN 325 MG/1
TABLET ORAL
Status: DISPENSED
Start: 2023-08-17 | End: 2023-08-17

## 2023-08-17 RX ADMIN — SODIUM CHLORIDE 500 ML: 9 INJECTION, SOLUTION INTRAVENOUS at 06:08

## 2023-08-17 RX ADMIN — ACETAMINOPHEN 650 MG: 325 TABLET ORAL at 02:08

## 2023-08-17 RX ADMIN — LEVETIRACETAM 1500 MG: 100 INJECTION INTRAVENOUS at 08:08

## 2023-08-17 RX ADMIN — PROPOFOL 35 MCG/KG/MIN: 10 INJECTION, EMULSION INTRAVENOUS at 01:08

## 2023-08-17 RX ADMIN — POTASSIUM BICARBONATE 50 MEQ: 978 TABLET, EFFERVESCENT ORAL at 04:08

## 2023-08-17 RX ADMIN — SENNOSIDES AND DOCUSATE SODIUM 1 TABLET: 50; 8.6 TABLET ORAL at 08:08

## 2023-08-17 RX ADMIN — PROPOFOL 35 MCG/KG/MIN: 10 INJECTION, EMULSION INTRAVENOUS at 12:08

## 2023-08-17 RX ADMIN — ASPIRIN 81 MG 81 MG: 81 TABLET ORAL at 08:08

## 2023-08-17 RX ADMIN — ENOXAPARIN SODIUM 40 MG: 40 INJECTION SUBCUTANEOUS at 05:08

## 2023-08-17 RX ADMIN — Medication 10 MG: at 07:08

## 2023-08-17 RX ADMIN — MUPIROCIN: 20 OINTMENT TOPICAL at 08:08

## 2023-08-17 RX ADMIN — Medication 100 MCG/HR: at 01:08

## 2023-08-17 RX ADMIN — FAMOTIDINE 20 MG: 10 INJECTION, SOLUTION INTRAVENOUS at 08:08

## 2023-08-17 NOTE — ASSESSMENT & PLAN NOTE
63M with epilepsy maintained on Keppra 750mg QID, DVT on Xarelto s/p IVC filter placement who presents as a transfer from OSH for concern of status epilepticus. Intubated at OSH due to low GCS. Received 4.5g Keppra load and started on Propofol and Fentanyl. CTH with chronic small vessel ischemic change but without acute findings. CT Chest with opacification of right lower lobe, and fusiform dilation of the ascending thoracic aorta (4.2 cm).  Initial Lactate 4.9, Ammonia 43, UDS positive for amphetamines and benzodiazepines (given per EMS).     Neuro   - Admit to NCC  - Q1h neurochecks while in ICU   - moves all limbs spontaneously when woken, does not follow commands  - Q1h vitals while in ICU  - HOB@30  - Continue Keppra 1500mg BID  - MRI Brain w/o acute intracranial pathology   - Cap EEG overnight, formal hook up today  - Propofol @35  - Fentanyl to maintain RASS 0  8/17/2023: EEG no seizures, wean off sedation and try spontaneously breathing trial    Cards  - SBP <160  - EKG  - Lipid panel, A1c pending  - Coags wnl     Resp  - intubated and sedated, mechanically ventilated  - OSH CXR with RLL opacification, repeat CXR today wnl   Vent Mode: A/C  Oxygen Concentration (%):  [30] 30  Resp Rate Total:  [16 br/min-19 br/min] 16 br/min  Vt Set:  [420 mL] 420 mL  PEEP/CPAP:  [5 cmH20] 5 cmH20  Mean Airway Pressure:  [8 cmH20-9.4 cmH20] 9 cmH20  Wean sedation and try SBT    Renal  - Strict I/Os  - external catheter in place   - bladder scan q6h     FEN/GI  - Daily CMP, Mag, Phos - replete electrolytes PRN  - start trickle feeds today, consult nutrition     Endo  - TSH 25   - repeat TSH elevated   - T3 and T4 wnl     H/O  - Daily CBC, transfuse PRN  - ASA daily   - Lovenox DVT ppx (will clarify home Xarelto given h/o DVT and IVC filter)    ID  - ABx pending further work up, received Vanc + Cefepime x1 PTA  - afebrile, WCC wnl     Other  - PT/OT/SLP as appropriate  - CM/SW consult for dispo planning

## 2023-08-17 NOTE — PROGRESS NOTES
[facility]  Neurocritical Care  Progress Note    Admit Date: 8/15/2023  Service Date: 08/17/2023  Length of Stay: 2    Subjective:     Chief Complaint: Status epilepticus    History of Present Illness: Sam Zamora is a 63 year old male with a medical history significant for epilepsy maintained on Keppra 750mg QID, DVT on Xarelto s/p IVC filter placement who presents as a transfer from OSH for concern of status epilepticus. Patient presented to OSH after being found down at home. EMS noted 3 witnessed seizure events without return to baseline. He was intubated at OSH due to low GCS. Received 4.5g Keppra load and started on Propofol and Fentanyl. CTH showed chronic small vessel ischemic change without acute findings. CT Chest with opacification of right lower lobe, and fusiform dilation of the ascending thoracic aorta (4.2 cm).  Lactate 4.9, Ammonia 43, UDS positive for amphetamines and benzodiazepines (given per EMS).     Recommendation was made to transfer patient to [facility] for higher level of care on continuous EEG monitoring.       Hospital Course: 8/17/2023 NAEON, EEG no seizures per epilepsy, wean sedation, SBT          Review of Systems  Unable to obtain a complete ROS due to level of consciousness.  Objective:     Vitals:  Temp: 99 °F (37.2 °C)  Pulse: 78  Rhythm: normal sinus rhythm  BP: 122/63  MAP (mmHg): 86  Resp: 16  SpO2: 99 %  Oxygen Concentration (%): 30  Vent Mode: A/C  Set Rate: 16 BPM  Vt Set: 420 mL  PEEP/CPAP: 5 cmH20  Peak Airway Pressure: 16 cmH20  Mean Airway Pressure: 9 cmH20  Plateau Pressure: 0 cmH20    Temp  Min: 97.8 °F (36.6 °C)  Max: 100.6 °F (38.1 °C)  Pulse  Min: 64  Max: 82  BP  Min: 82/49  Max: 132/64  MAP (mmHg)  Min: 61  Max: 87  Resp  Min: 16  Max: 22  SpO2  Min: 97 %  Max: 100 %  Oxygen Concentration (%)  Min: 30  Max: 30    08/16 0701 - 08/17 0700  In: 1109 [I.V.:555]  Out: 750 [Urine:750]            Physical Exam  GA: Alert, comfortable, no acute  distress.   HEENT: No scleral icterus or JVD.   Pulmonary: Clear to auscultation A/L.   Cardiac: RRR S1 & S2 w/o rubs/murmurs/gallops.   Abdominal: Bowel sounds present x 4. No appreciable hepatosplenomegaly.  Skin: No jaundice, rashes, or visible lesions.  Neuro:  --GCS: E2 Vt1 M6  --Mental Status:  medically sedated  --CN II-XII grossly intact.   --Pupils 2mm, PERRL.   --Corneal reflex, gag, cough intact.  --STANLEY spont       Medications:  Continuousfentanyl, Last Rate: 62.5 mcg/hr (08/17/23 0700)  propofoL    Scheduledaspirin, 81 mg, Daily  enoxparin, 40 mg, Q24H (prophylaxis, 1700)  famotidine (PF), 20 mg, Q12H  levETIRAcetam (Keppra) IV (PEDS and ADULTS), 1,500 mg, Q12H  mupirocin, , BID  senna-docusate 8.6-50 mg, 1 tablet, Daily    PRNacetaminophen, 650 mg, Q6H PRN  magnesium oxide, 800 mg, PRN  magnesium oxide, 800 mg, PRN  potassium bicarbonate, 35 mEq, PRN  potassium bicarbonate, 50 mEq, PRN  potassium bicarbonate, 60 mEq, PRN  potassium, sodium phosphates, 2 packet, PRN  potassium, sodium phosphates, 2 packet, PRN  potassium, sodium phosphates, 2 packet, PRN  sodium chloride 0.9%, 10 mL, PRN      Today I personally reviewed pertinent medications, lines/drains/airways, imaging, cardiology results, laboratory results, microbiology results,     Diet  No diet orders on file      Assessment/Plan:     Neuro  * Status epilepticus  63M with epilepsy maintained on Keppra 750mg QID, DVT on Xarelto s/p IVC filter placement who presents as a transfer from OSH for concern of status epilepticus. Intubated at OSH due to low GCS. Received 4.5g Keppra load and started on Propofol and Fentanyl. CTH with chronic small vessel ischemic change but without acute findings. CT Chest with opacification of right lower lobe, and fusiform dilation of the ascending thoracic aorta (4.2 cm).  Initial Lactate 4.9, Ammonia 43, UDS positive for amphetamines and benzodiazepines (given per EMS).     Neuro   - Admit to [facility]  - Critical access hospital neurochecks  while in ICU   - moves all limbs spontaneously when woken, does not follow commands  - Q1h vitals while in ICU  - HOB@30  - Continue Keppra 1500mg BID  - MRI Brain w/o acute intracranial pathology   - Cap EEG overnight, formal hook up today  - Propofol @35  - Fentanyl to maintain RASS 0  8/17/2023: EEG no seizures, wean off sedation and try spontaneously breathing trial    Cards  - SBP <160  - EKG  - Lipid panel, A1c pending  - Coags wnl     Resp  - intubated and sedated, mechanically ventilated  - OSH CXR with RLL opacification, repeat CXR today wnl   Vent Mode: A/C  Oxygen Concentration (%):  [30] 30  Resp Rate Total:  [16 br/min-19 br/min] 16 br/min  Vt Set:  [420 mL] 420 mL  PEEP/CPAP:  [5 cmH20] 5 cmH20  Mean Airway Pressure:  [8 cmH20-9.4 cmH20] 9 cmH20  Wean sedation and try SBT    Renal  - Strict I/Os  - external catheter in place   - bladder scan q6h     FEN/GI  - Daily CMP, Mag, Phos - replete electrolytes PRN  - start trickle feeds today, consult nutrition     Endo  - TSH 25   - repeat TSH elevated   - T3 and T4 wnl     H/O  - Daily CBC, transfuse PRN  - ASA daily   - Lovenox DVT ppx (will clarify home Xarelto given h/o DVT and IVC filter)    ID  - ABx pending further work up, received Vanc + Cefepime x1 PTA  - afebrile, WCC wnl     Other  - PT/OT/SLP as appropriate  - CM/SW consult for dispo planning          The patient is being Prophylaxed for:  Venous Thromboembolism with: Chemical  Stress Ulcer with: H2B  Ventilator Pneumonia with: Chlorohexidine    Activity Orders          Turn patient every 2 hours starting at 08/16 0800    Progressive Mobility Protocol (mobilize patient to their highest level of functioning at least twice daily) starting at 08/15 2000        Full Code    Norma Case NP  Neurocritical Care  [facility]

## 2023-08-17 NOTE — HOSPITAL COURSE
8/17/2023 NAEON, EEG no seizures per epilepsy, wean sedation, SBT  8/18/2023: remained extubated overnight, start Lisinopril, plan to stepdown to floor

## 2023-08-17 NOTE — CARE UPDATE
Pt extubated per order. Placed pt on 3 LPM NC, and tolerating well. No respiratory distress noted. Team at bedside. Will continue to monitor.

## 2023-08-17 NOTE — PLAN OF CARE
[facility] Care Plan    POC reviewed with Sam Zamora; family not at bedside. Pt unable to verbalize understanding. No acute events overnight. Pt progressing toward goals. Will continue to monitor. See below and flowsheets for full assessment and VS info.     -NAEON; no witnessed seizures  -nontitrating prop @ 35  -fent 0-200  -cEEG cap  -Tmax 100.6; blankets off; acetaminophen administered  -pressures soft late in shift(MAP 61-70), 300 cc U/O elke urine via straight cath, 500 mL fluid bolus started; pt responding well      Is this a stroke patient? no    Neuro:  Prabha Coma Scale  Best Eye Response: 4-->(E4) spontaneous  Best Motor Response: 5-->(M5) localizes pain  Best Verbal Response: 1-->(V1) none  [place] Coma Scale Score: 10  Assessment Qualifiers: patient chemically sedated or paralyzed  Pupil PERRLA: yes     24hr Temp:  [37.4 °F (3 °C)-98.4 °F (36.9 °C)]     CV:   Rhythm: normal sinus rhythm  BP goals:   SBP < 180  MAP > 65    Resp:      Vent Mode: A/C  Set Rate: 16 BPM  Oxygen Concentration (%): 30  Vt Set: 420 mL  PEEP/CPAP: 5 cmH20    Plan: status epilepticus    GI/:     Diet/Nutrition Received: NPO  Last Bowel Movement: 08/14/23  Voiding Characteristics: external catheter    Intake/Output Summary (Last 24 hours) at 8/16/2023 1900  Last data filed at 8/16/2023 1805  Gross per 24 hour   Intake 771.58 ml   Output 700 ml   Net 71.58 ml          Labs/Accuchecks:  Recent Labs   Lab 08/16/23  0002   WBC 6.40   RBC 3.98*   HGB 11.6*   HCT 34.3*         Recent Labs   Lab 08/16/23  0005   *   K 3.2*   CO2 23      BUN 13   CREATININE 1.0   ALKPHOS 53*   ALT 7*   AST 24   BILITOT 0.7      Recent Labs   Lab 08/15/23  1145   INR 1.2   APTT 29.3      Recent Labs   Lab 08/15/23  1145   CPK 96       Electrolytes: Electrolytes replaced  Accuchecks: none    Gtts:   fentanyl 75 mcg/hr (08/16/23 1805)    propofol 35 mcg/kg/min (08/16/23 1805)       LDA/Wounds:  Lines/Drains/Airways       Drain   Duration                  NG/OG Tube 08/15/23 2200 Center mouth <1 day    Male External Urinary Catheter 08/16/23 0723 Medium <1 day              Airway  Duration                  Airway - Non-Surgical -- days              Peripheral Intravenous Line  Duration                  Peripheral IV - Single Lumen 08/15/23 1300 20 G Anterior;Distal;Left Forearm 1 day         Peripheral IV - Single Lumen 08/15/23 1500 18 G Anterior;Right Forearm 1 day         Peripheral IV - Single Lumen 08/15/23 1500 20 G Left Antecubital 1 day                  Wounds: Yes  Wound care consulted: Yes

## 2023-08-17 NOTE — PROCEDURES
[facility] EEG/VIDEO MONITORING REPORT  Sam Zamora  [id number]  1960    DATE OF SERVICE:  08/17/2023  DATE OF ADMISSION: 8/15/2023  7:33 PM    ADMITTING/REQUESTING PROVIDER: Julito King MD    REASON FOR CONSULT:  63-year-old man with an established diagnosis of epilepsy on levetiracetam admitted with 3 episodes of decreased responsiveness with abnormal movements followed by prolonged decreased responsiveness.  Evaluate for evidence of epileptiform activity.    METHODOLOGY   Electroencephalographic (EEG) recording is with electrodes placed according to the International 10-20 placement system.  Thirty two (32) channels of digital signal (sampling rate of 512/sec) including T1 and T2 was simultaneously recorded from the scalp and may include  EKG, EMG, and/or eye monitors.  Recording band pass was 0.1 to 512 hz.  Digital video recording of the patient is simultaneously recorded with the EEG.  The patient is instructed report clinical symptoms which may occur during the recording session.  EEG and video recording is stored and archived in digital format.  Activation procedures which include photic stimulation, hyperventilation and instructing patients to perform simple task are done in selected patients.   The EEG is displayed on a monitor screen and can be reviewed using different montages.  Computer assisted analysis is employed to detect spike and electrographic seizure activity.   The entire record is submitted for computer analysis.  The entire recording is visually reviewed and the times identified by computer analysis as being spikes or seizures are reviewed again.  Compresses spectral analysis (CSA) is also performed on the activity recorded from each individual channel.  This is displayed as a power display of frequencies from 0 to 30 Hz over time.   The CSA is reviewed looking for asymmetries in power between homologous areas of the scalp and then compared with the original EEG recording.     [employer] software  is also utilized in the review of this study.  This software suite analyzes the EEG recording in multiple domains.  Coherence and rhythmicity is computed to identify EEG sections which may contain organized seizures.  Each channel undergoes analysis to detect presence of spike and sharp waves which have special and morphological characteristic of epileptic activity.  The routine EEG recording is converted from spacial into frequency domain.  This is then displayed comparing homologous areas to identify areas of significant asymmetry.  Algorithm to identify non-cortically generated artifact is used to separate eye movement, EMG and other artifact from the EEG.      RECORDING TIMES  Start on 08/16/2023 at 14:10 p.m.  Stop on 08/17/2023 at 14:46 p.m.-> End of the Recording Session  A total of 24 hours and 6 minutes of EEG recording is obtained.    EEG FINDINGS  Background activity:   The background is continuous, relatively symmetric, mixed frequency activity.  There is multifocal polymorphic slowing seen bilaterally.    There are no pushbutton activations.    Sleep:  There is evidence of state changes with the appearance of sleep architecture.    Activation procedures:   The patient is intubated but does follow simple commands reliably.    Cardiac Monitor:   Heart rate appears generally regular on a single lead EKG.    Impression:   This is an abnormal continuous EEG monitoring study because of generalized background slowing consistent with diffuse cortical dysfunction and a moderate encephalopathy with evidence of subcortical/deep midline dysfunction bilaterally.  This finding is nonspecific with regards to etiology but can be seen in the setting of toxic/metabolic derangements, infection, and as a medication effect.  There are no prominent focal findings however encephalopathy obscures focal findings.  There are no pushbutton activations, no epileptiform discharges, and no electrographic seizures.    Vivian Rojas MD  PhD FACNS  Neurology-Epilepsy  [facility].

## 2023-08-17 NOTE — SUBJECTIVE & OBJECTIVE
Review of Systems  Unable to obtain a complete ROS due to level of consciousness.  Objective:     Vitals:  Temp: 99 °F (37.2 °C)  Pulse: 78  Rhythm: normal sinus rhythm  BP: 122/63  MAP (mmHg): 86  Resp: 16  SpO2: 99 %  Oxygen Concentration (%): 30  Vent Mode: A/C  Set Rate: 16 BPM  Vt Set: 420 mL  PEEP/CPAP: 5 cmH20  Peak Airway Pressure: 16 cmH20  Mean Airway Pressure: 9 cmH20  Plateau Pressure: 0 cmH20    Temp  Min: 97.8 °F (36.6 °C)  Max: 100.6 °F (38.1 °C)  Pulse  Min: 64  Max: 82  BP  Min: 82/49  Max: 132/64  MAP (mmHg)  Min: 61  Max: 87  Resp  Min: 16  Max: 22  SpO2  Min: 97 %  Max: 100 %  Oxygen Concentration (%)  Min: 30  Max: 30    08/16 0701 - 08/17 0700  In: 1109 [I.V.:555]  Out: 750 [Urine:750]            Physical Exam  GA: Alert, comfortable, no acute distress.   HEENT: No scleral icterus or JVD.   Pulmonary: Clear to auscultation A/L.   Cardiac: RRR S1 & S2 w/o rubs/murmurs/gallops.   Abdominal: Bowel sounds present x 4. No appreciable hepatosplenomegaly.  Skin: No jaundice, rashes, or visible lesions.  Neuro:  --GCS: E2 Vt1 M6  --Mental Status:  medically sedated  --CN II-XII grossly intact.   --Pupils 2mm, PERRL.   --Corneal reflex, gag, cough intact.  --STANLEY spont       Medications:  Continuousfentanyl, Last Rate: 62.5 mcg/hr (08/17/23 0700)  propofoL    Scheduledaspirin, 81 mg, Daily  enoxparin, 40 mg, Q24H (prophylaxis, 1700)  famotidine (PF), 20 mg, Q12H  levETIRAcetam (Keppra) IV (PEDS and ADULTS), 1,500 mg, Q12H  mupirocin, , BID  senna-docusate 8.6-50 mg, 1 tablet, Daily    PRNacetaminophen, 650 mg, Q6H PRN  magnesium oxide, 800 mg, PRN  magnesium oxide, 800 mg, PRN  potassium bicarbonate, 35 mEq, PRN  potassium bicarbonate, 50 mEq, PRN  potassium bicarbonate, 60 mEq, PRN  potassium, sodium phosphates, 2 packet, PRN  potassium, sodium phosphates, 2 packet, PRN  potassium, sodium phosphates, 2 packet, PRN  sodium chloride 0.9%, 10 mL, PRN      Today I personally reviewed pertinent  medications, lines/drains/airways, imaging, cardiology results, laboratory results, microbiology results,     Diet  No diet orders on file

## 2023-08-17 NOTE — PLAN OF CARE
[facility] Care Plan    POC reviewed with Sam Zamora and family at 1400. Pt verbalized understanding. Questions and concerns addressed. Pt progressing toward goals. Will continue to monitor. See below and flowsheets for full assessment and VS info.     Patient extubated to 3L/NC, maintaining SPO2 > 92%  He is awake and alert, oriented to self and knows he in the hospital and is able to give a medical history but he is unsure of the time and situation. His voice is raspy post extubation. Strong cough with thick productive sputum noted.            Is this a stroke patient? no    Neuro:  [place] Coma Scale  Best Eye Response: 4-->(E4) spontaneous  Best Motor Response: 6-->(M6) obeys commands  Best Verbal Response: 4-->(V4) confused  Prabha Coma Scale Score: 14  Assessment Qualifiers: patient chemically sedated or paralyzed, patient intubated  Pupil PERRLA: yes     24 hr Temp:  [98 °F (36.7 °C)-100.6 °F (38.1 °C)]     CV:   Rhythm: normal sinus rhythm  BP goals:   SBP < 180  MAP > 65    Resp:      Vent Mode: Spont  Set Rate: 16 BPM  Oxygen Concentration (%): 30  Vt Set: 420 mL  PEEP/CPAP: 5 cmH20  Pressure Support: 5 cmH20    Plan:  Extubated to 3L/NC    GI/:     Diet/Nutrition Received: tube feeding  Last Bowel Movement: 08/14/23  Voiding Characteristics: external catheter    Intake/Output Summary (Last 24 hours) at 8/17/2023 1837  Last data filed at 8/17/2023 1800  Gross per 24 hour   Intake 1217.57 ml   Output 500 ml   Net 717.57 ml     Unmeasured Output  Urine Occurrence: 1    Labs/Accuchecks:  Recent Labs   Lab 08/17/23  0038   WBC 5.96   RBC 4.34*   HGB 12.2*   HCT 37.1*   *      Recent Labs   Lab 08/17/23  0038      K 3.5   CO2 26      BUN 15   CREATININE 1.1   ALKPHOS 58   ALT 8*   AST 29   BILITOT 0.8      Recent Labs   Lab 08/15/23  1145   INR 1.2   APTT 29.3      Recent Labs   Lab 08/15/23  1145   CPK 96       Electrolytes: Electrolytes replaced  Accuchecks:  none    Gtts:      LDA/Wounds:  Lines/Drains/Airways       Drain  Duration             Male External Urinary Catheter 08/16/23 0723 Medium 1 day              Peripheral Intravenous Line  Duration                  Peripheral IV - Single Lumen 08/15/23 1300 20 G Anterior;Distal;Left Forearm 2 days         Peripheral IV - Single Lumen 08/15/23 1500 18 G Anterior;Right Forearm 2 days         Peripheral IV - Single Lumen 08/15/23 1500 20 G Left Antecubital 2 days                  Wounds: Yes  Wound care consulted: No

## 2023-08-18 LAB
ALBUMIN SERPL BCP-MCNC: 2.8 G/DL (ref 3.5–5.2)
ALP SERPL-CCNC: 55 U/L (ref 55–135)
ALT SERPL W/O P-5'-P-CCNC: 11 U/L (ref 10–44)
ANION GAP SERPL CALC-SCNC: 6 MMOL/L (ref 8–16)
AST SERPL-CCNC: 34 U/L (ref 10–40)
BASOPHILS # BLD AUTO: 0.02 K/UL (ref 0–0.2)
BASOPHILS NFR BLD: 0.4 % (ref 0–1.9)
BILIRUB SERPL-MCNC: 0.8 MG/DL (ref 0.1–1)
BUN SERPL-MCNC: 14 MG/DL (ref 8–23)
CALCIUM SERPL-MCNC: 8.1 MG/DL (ref 8.7–10.5)
CHLORIDE SERPL-SCNC: 105 MMOL/L (ref 95–110)
CO2 SERPL-SCNC: 24 MMOL/L (ref 23–29)
CREAT SERPL-MCNC: 0.9 MG/DL (ref 0.5–1.4)
DIFFERENTIAL METHOD: ABNORMAL
EOSINOPHIL # BLD AUTO: 0.1 K/UL (ref 0–0.5)
EOSINOPHIL NFR BLD: 1.4 % (ref 0–8)
ERYTHROCYTE [DISTWIDTH] IN BLOOD BY AUTOMATED COUNT: 13.4 % (ref 11.5–14.5)
EST. GFR  (NO RACE VARIABLE): >60 ML/MIN/1.73 M^2
GLUCOSE SERPL-MCNC: 90 MG/DL (ref 70–110)
HCT VFR BLD AUTO: 34.7 % (ref 40–54)
HGB BLD-MCNC: 11.8 G/DL (ref 14–18)
IMM GRANULOCYTES # BLD AUTO: 0.02 K/UL (ref 0–0.04)
IMM GRANULOCYTES NFR BLD AUTO: 0.4 % (ref 0–0.5)
LYMPHOCYTES # BLD AUTO: 0.8 K/UL (ref 1–4.8)
LYMPHOCYTES NFR BLD: 16.6 % (ref 18–48)
MAGNESIUM SERPL-MCNC: 2 MG/DL (ref 1.6–2.6)
MCH RBC QN AUTO: 28.5 PG (ref 27–31)
MCHC RBC AUTO-ENTMCNC: 34 G/DL (ref 32–36)
MCV RBC AUTO: 84 FL (ref 82–98)
MONOCYTES # BLD AUTO: 0.5 K/UL (ref 0.3–1)
MONOCYTES NFR BLD: 10.7 % (ref 4–15)
NEUTROPHILS # BLD AUTO: 3.5 K/UL (ref 1.8–7.7)
NEUTROPHILS NFR BLD: 70.5 % (ref 38–73)
NRBC BLD-RTO: 0 /100 WBC
PHOSPHATE SERPL-MCNC: 3.1 MG/DL (ref 2.7–4.5)
PLATELET # BLD AUTO: 137 K/UL (ref 150–450)
PMV BLD AUTO: 10.5 FL (ref 9.2–12.9)
POTASSIUM SERPL-SCNC: 4.2 MMOL/L (ref 3.5–5.1)
PROT SERPL-MCNC: 6.6 G/DL (ref 6–8.4)
RBC # BLD AUTO: 4.14 M/UL (ref 4.6–6.2)
SODIUM SERPL-SCNC: 135 MMOL/L (ref 136–145)
WBC # BLD AUTO: 4.95 K/UL (ref 3.9–12.7)

## 2023-08-18 PROCEDURE — 84100 ASSAY OF PHOSPHORUS: CPT | Performed by: STUDENT IN AN ORGANIZED HEALTH CARE EDUCATION/TRAINING PROGRAM

## 2023-08-18 PROCEDURE — 97163 PT EVAL HIGH COMPLEX 45 MIN: CPT

## 2023-08-18 PROCEDURE — 94761 N-INVAS EAR/PLS OXIMETRY MLT: CPT

## 2023-08-18 PROCEDURE — 25000003 PHARM REV CODE 250: Performed by: NURSE PRACTITIONER

## 2023-08-18 PROCEDURE — 99223 PR INITIAL HOSPITAL CARE,LEVL III: ICD-10-PCS | Mod: FS,,, | Performed by: NURSE PRACTITIONER

## 2023-08-18 PROCEDURE — 21400001 HC TELEMETRY ROOM

## 2023-08-18 PROCEDURE — 99223 1ST HOSP IP/OBS HIGH 75: CPT | Mod: FS,,, | Performed by: NURSE PRACTITIONER

## 2023-08-18 PROCEDURE — 83735 ASSAY OF MAGNESIUM: CPT | Performed by: STUDENT IN AN ORGANIZED HEALTH CARE EDUCATION/TRAINING PROGRAM

## 2023-08-18 PROCEDURE — 80053 COMPREHEN METABOLIC PANEL: CPT | Performed by: STUDENT IN AN ORGANIZED HEALTH CARE EDUCATION/TRAINING PROGRAM

## 2023-08-18 PROCEDURE — 97535 SELF CARE MNGMENT TRAINING: CPT

## 2023-08-18 PROCEDURE — 97165 OT EVAL LOW COMPLEX 30 MIN: CPT

## 2023-08-18 PROCEDURE — 97116 GAIT TRAINING THERAPY: CPT

## 2023-08-18 PROCEDURE — 92610 EVALUATE SWALLOWING FUNCTION: CPT

## 2023-08-18 PROCEDURE — 85025 COMPLETE CBC W/AUTO DIFF WBC: CPT | Performed by: INTERNAL MEDICINE

## 2023-08-18 PROCEDURE — 63600175 PHARM REV CODE 636 W HCPCS

## 2023-08-18 PROCEDURE — 63600175 PHARM REV CODE 636 W HCPCS: Performed by: STUDENT IN AN ORGANIZED HEALTH CARE EDUCATION/TRAINING PROGRAM

## 2023-08-18 PROCEDURE — 97530 THERAPEUTIC ACTIVITIES: CPT

## 2023-08-18 RX ORDER — ACETAMINOPHEN 325 MG/1
650 TABLET ORAL EVERY 6 HOURS PRN
Status: DISCONTINUED | OUTPATIENT
Start: 2023-08-18 | End: 2023-08-20 | Stop reason: HOSPADM

## 2023-08-18 RX ORDER — LANOLIN ALCOHOL/MO/W.PET/CERES
800 CREAM (GRAM) TOPICAL
Status: DISCONTINUED | OUTPATIENT
Start: 2023-08-18 | End: 2023-08-19

## 2023-08-18 RX ORDER — SODIUM,POTASSIUM PHOSPHATES 280-250MG
2 POWDER IN PACKET (EA) ORAL
Status: DISCONTINUED | OUTPATIENT
Start: 2023-08-18 | End: 2023-08-19

## 2023-08-18 RX ORDER — FENTANYL CITRATE 50 UG/ML
INJECTION, SOLUTION INTRAMUSCULAR; INTRAVENOUS
Status: DISPENSED
Start: 2023-08-18 | End: 2023-08-19

## 2023-08-18 RX ORDER — LEVETIRACETAM 500 MG/1
1500 TABLET ORAL 2 TIMES DAILY
Status: DISCONTINUED | OUTPATIENT
Start: 2023-08-18 | End: 2023-08-20 | Stop reason: HOSPADM

## 2023-08-18 RX ORDER — NAPROXEN SODIUM 220 MG/1
81 TABLET, FILM COATED ORAL DAILY
Status: DISCONTINUED | OUTPATIENT
Start: 2023-08-19 | End: 2023-08-20 | Stop reason: HOSPADM

## 2023-08-18 RX ORDER — MIDAZOLAM HYDROCHLORIDE 1 MG/ML
INJECTION INTRAMUSCULAR; INTRAVENOUS
Status: DISPENSED
Start: 2023-08-18 | End: 2023-08-19

## 2023-08-18 RX ORDER — AMOXICILLIN 250 MG
1 CAPSULE ORAL DAILY
Status: DISCONTINUED | OUTPATIENT
Start: 2023-08-19 | End: 2023-08-20 | Stop reason: HOSPADM

## 2023-08-18 RX ORDER — LISINOPRIL 10 MG/1
10 TABLET ORAL DAILY
Status: DISCONTINUED | OUTPATIENT
Start: 2023-08-18 | End: 2023-08-20 | Stop reason: HOSPADM

## 2023-08-18 RX ADMIN — MUPIROCIN: 20 OINTMENT TOPICAL at 08:08

## 2023-08-18 RX ADMIN — LEVETIRACETAM 1500 MG: 500 TABLET, FILM COATED ORAL at 08:08

## 2023-08-18 RX ADMIN — RIVAROXABAN 20 MG: 10 TABLET, FILM COATED ORAL at 05:08

## 2023-08-18 RX ADMIN — Medication 10 MG: at 08:08

## 2023-08-18 RX ADMIN — Medication 10 MG: at 03:08

## 2023-08-18 RX ADMIN — LEVETIRACETAM 1500 MG: 100 INJECTION INTRAVENOUS at 08:08

## 2023-08-18 RX ADMIN — HYDRALAZINE HYDROCHLORIDE 10 MG: 20 INJECTION INTRAMUSCULAR; INTRAVENOUS at 07:08

## 2023-08-18 RX ADMIN — LISINOPRIL 10 MG: 10 TABLET ORAL at 11:08

## 2023-08-18 RX ADMIN — HYDRALAZINE HYDROCHLORIDE 10 MG: 20 INJECTION INTRAMUSCULAR; INTRAVENOUS at 01:08

## 2023-08-18 NOTE — CONSULTS
[facility] Critical Care  Wound Care    Patient Name:  Sam Zamora   MRN:  [id number]  Date: 8/18/2023  Diagnosis: Status epilepticus    History:     Past Medical History:   Diagnosis Date    DVT (deep venous thrombosis)     Stroke     Venous insufficiency        Social History     Socioeconomic History    Marital status:    Tobacco Use    Smoking status: Every Day     Current packs/day: 0.80     Types: Cigarettes    Tobacco comments:     3/4 pk daily   Substance and Sexual Activity    Alcohol use: No     Social Determinants of Health     Financial Resource Strain: High Risk (8/16/2023)    Overall Financial Resource Strain (CARDIA)     Difficulty of Paying Living Expenses: Very hard   Food Insecurity: Food Insecurity Present (8/16/2023)    Hunger Vital Sign     Worried About Running Out of Food in the Last Year: Often true     Ran Out of Food in the Last Year: Often true   Transportation Needs: No Transportation Needs (8/16/2023)    PRAPARE - Transportation     Lack of Transportation (Medical): No     Lack of Transportation (Non-Medical): No   Physical Activity: Inactive (8/16/2023)    Exercise Vital Sign     Days of Exercise per Week: 0 days     Minutes of Exercise per Session: 0 min   Social Connections: Socially Isolated (8/16/2023)    Social Connection and Isolation Panel [NHANES]     Frequency of Communication with Friends and Family: Twice a week     Frequency of Social Gatherings with Friends and Family: Never     Attends Restoration Services: Never     Active Member of Clubs or Organizations: No     Attends Club or Organization Meetings: Never     Marital Status:    Housing Stability: High Risk (8/16/2023)    Housing Stability Vital Sign     Unable to Pay for Housing in the Last Year: Yes     Number of Places Lived in the Last Year: 1     Unstable Housing in the Last Year: Yes       Precautions:     Allergies as of 08/15/2023    (No Known Allergies)       WOC Assessment  Details/Treatment     Patient seen for wound care consultation for R elbow.   Reviewed chart for this encounter.   See Flow Sheet for findings.    Pt found lying in bed, agreeable to care at this time. R elbow foam dressing peeled back to reveal intact pink/red skin - slowly blanches. Pt was found down in bathroom prior to admission. Foam dressing reapplied, arm elevated on pillows for offloading.    RECOMMENDATIONS:  qShift - R elbow - peel back foam dressing and assess underlying skin. Change dressing q5 days/PRN for soilage. Offload arm w/ pillows.    Discussed POC with patient and primary nurse.   See EMR for orders & patient education.      Nursing to continue care.  Nursing to maintain pressure injury prevention interventions.           08/18/23 1131   WOCN Assessment   WOCN Total Time (mins) 10   Visit Date 08/18/23   Visit Time 1131   Consult Type New   WOCN Speciality Wound   Intervention assessed;applied;coordination of care;chart review;orders   Teaching on-going        Altered Skin Integrity 08/18/23 0935 Right dorsal Elbow Intact skin with non-blanchable redness of localized area   Date First Assessed/Time First Assessed: 08/18/23 0935   Altered Skin Integrity Present on Admission - Did Patient arrive to the hospital with altered skin?: suspected hospital acquired  Side: Right  Orientation: dorsal  Location: Elbow  Description o...   Wound Image    Dressing Appearance Dry;Intact;Clean   Drainage Amount None   Drainage Characteristics/Odor No odor   Appearance Pink;Red;Intact   Tissue loss description Not applicable   Periwound Area Intact;Dry   Wound Edges Rolled/closed   Dressing Foam

## 2023-08-18 NOTE — PT/OT/SLP EVAL
"Speech Language Pathology Evaluation  Bedside Swallow    Patient Name:  Sam Zamora   MRN:  [id number]  Admitting Diagnosis: Status epilepticus    Recommendations:                 General Recommendations:  Dysphagia therapy and Cognitive-linguistic evaluation  Diet recommendations:  NPO, NPO Mildly thickened liquids for pleasure only, meds crushed in puree  Aspiration Precautions: Strict aspiration precautions   General Precautions: Standard, fall, aspiration  Communication strategies:  go to room if call light pushed    Assessment:     Sam Zamora is a 63 y.o. male with an SLP diagnosis of Dysphagia.      History:     Past Medical History:   Diagnosis Date    DVT (deep venous thrombosis)     Stroke     Venous insufficiency        Past Surgical History:   Procedure Laterality Date    evlt      GUADALUPE FILTER PLACEMENT      RI EEG, W/VIDEO, CONT RECORD, I&R, >12<26 HRS  8/17/2023    REVISION TOTAL HIP ARTHROPLASTY Right        Social History: Patient lives with his wife. He reports that he ate regular solids and thin liquids pta. He took medication whole with water. He expresses that he was a . He reports that he was fairly IND pta (managing finances, taking medications, and making doctor's appointments).     Prior Intubation HX:  8/15-8/17    Modified Barium Swallow: None this admission.    Chest X-Rays: 8/16: "ET tube above the jacques.  NG tube below the EG junction.  Heart size normal.  The lungs are clear.  No pleural effusion."    Prior diet: NPO.    Subjective     RN cleared pt for bedside evaluation. Pt was awake, alert, and cooperative t/o evaluation.     Patient stated, "you'll come back in two days" re: ST explaining that ST will resume on Monday.     Pain/Comfort:  Pain Rating 1: 0/10  Pain Rating Post-Intervention 1: 0/10    Respiratory Status: Room air    Objective:     Oral Musculature Evaluation  Oral Musculature: WFL  Dentition: present and adequate  Secretion Management: " adequate  Mucosal Quality: adequate  Oral Labial Strength and Mobility: functional retraction, functional seal, functional coordination  Lingual Strength and Mobility: functional lateral movement, functional protrusion  Velar Elevation: WFL  Volitional Cough: Elicited  Volitional Swallow: adequate hyolaryngeal rise during palpation  Voice Prior to PO Intake: Hoarse with decreased intensity    Bedside Swallow Eval:   Consistencies Assessed:  Thin liquids via open cup  Nectar thick liquids via straw and open cup  Puree x5   Solids x1 (small piece of bread)      Oral Phase:   Oral holding   Large, cyclical sips with thins     Pharyngeal Phase:   Facial Grimacing during swallow across all consistencies  Coughing with thins via open cup, mildly thickened liquids (nectar) via straw, and cracker  throat clearing  Throat clears with puree and mildly thickened liquids    Compensatory Strategies  None    Treatment: Patient was ox4. He followed commands and answered questions appropriately. He was observed to have a few appropriate and spontaneous utterances. He independently fed himself across all consistencies. Patient appeared to tolerate mildly thickened liquids with some throat clears though not consistent. Vocal quality remained clear with mildly thickened liquids. Patient required cued throat clear and subsequent swallow following additional PO trails of puree. PO discontinued 2/2 pt with high aspiration risk. Education provided re: role of SLP, diet recs, swallow precs, s/s aspiration and POC.  Pt verbalized understanding and agreement. RN updated on diet recs upon exiting the room.      Goals:   Multidisciplinary Problems       SLP Goals          Problem: SLP    Goal Priority Disciplines Outcome   SLP Goal     SLP Ongoing, Progressing   Description: Speech Language Pathology Goals  Goals expected to be met by 9/15:    1. The patient will participate in ongoing swallow evaluation to determine least restrictive  diet.  2. The patient will participate in a cognitive-linguistic evaluation to determine therapeutic needs.                                Plan:     Patient to be seen:  4 x/week   Plan of Care expires:  09/15/23  Plan of Care reviewed with:  patient   SLP Follow-Up:  Yes       Discharge recommendations:  other (see comments)   Barriers to Discharge:  Level of Skilled Assistance Needed      Time Tracking:     SLP Treatment Date:   08/18/23  Speech Start Time:  0949  Speech Stop Time:  1003     Speech Total Time (min):  14 min    Billable Minutes: Eval Swallow and Oral Function 6 and Self Care/Home Management Training 8    08/18/2023

## 2023-08-18 NOTE — PLAN OF CARE
Goals remain appropriate.  Problem: Occupational Therapy  Goal: Occupational Therapy Goal  Description: Goals set 8/18 to be addressed for 14 days with expiration date, 9/1:  Patient will increase functional independence with ADLs by performing:    Patient will demonstrate rolling to the right with modified independence.  Not met   Patient will demonstrate rolling to the left with modified independence.   Not met  Patient will demonstrate supine -sit with modified independence.   Not met  Patient will demonstrate stand pivot transfers with modified independence.   Not met  Patient will demonstrate grooming while standing with modified independence.   Not met  Patient will demonstrate upper body dressing with modified independence  while seated EOB.   Not met  Patient will demonstrate lower body dressing with modified independence while seated EOB.   Not met  Patient will demonstrate toileting with modified independence.   Not met  Patient's family / caregiver will demonstrate independence and safety with assisting patient with self-care skills and functional mobility.     Not met          Outcome: Ongoing, Progressing

## 2023-08-18 NOTE — PROGRESS NOTES
[facility]  Neurocritical Care  Progress Note    Admit Date: 8/15/2023  Service Date: 08/18/2023  Length of Stay: 3    Subjective:     Chief Complaint: Status epilepticus    History of Present Illness: Sam Zamora is a 63 year old male with a medical history significant for epilepsy maintained on Keppra 750mg QID, DVT on Xarelto s/p IVC filter placement who presents as a transfer from OSH for concern of status epilepticus. Patient presented to OSH after being found down at home. EMS noted 3 witnessed seizure events without return to baseline. He was intubated at OSH due to low GCS. Received 4.5g Keppra load and started on Propofol and Fentanyl. CTH showed chronic small vessel ischemic change without acute findings. CT Chest with opacification of right lower lobe, and fusiform dilation of the ascending thoracic aorta (4.2 cm).  Lactate 4.9, Ammonia 43, UDS positive for amphetamines and benzodiazepines (given per EMS).     Recommendation was made to transfer patient to [facility] for higher level of care on continuous EEG monitoring.       Hospital Course: 8/17/2023 NAEON, EEG no seizures per epilepsy, wean sedation, SBT  8/18/2023: remained extubated overnight, start Lisinopril, plan to stepdown to floor          Review of Systems  Unable to obtain a complete ROS due to level of consciousness.  Objective:     Vitals:  Temp: 98.2 °F (36.8 °C)  Pulse: 90  Rhythm: normal sinus rhythm  BP: (!) 177/83  MAP (mmHg): 119  Resp: 19  SpO2: 97 %    Temp  Min: 98 °F (36.7 °C)  Max: 98.4 °F (36.9 °C)  Pulse  Min: 69  Max: 99  BP  Min: 113/63  Max: 200/96  MAP (mmHg)  Min: 82  Max: 141  Resp  Min: 12  Max: 36  SpO2  Min: 93 %  Max: 98 %  Oxygen Concentration (%)  Min: 30  Max: 30    08/17 0701 - 08/18 0700  In: 453.4 [P.O.:30; I.V.:182]  Out: 1105 [Urine:1105]   Unmeasured Output  Urine Occurrence: 1        Physical Exam  GA: Alert, comfortable, no acute distress.   HEENT: No scleral icterus or JVD.   Pulmonary:  Clear to auscultation A/L.   Cardiac: RRR S1 & S2 w/o rubs/murmurs/gallops.   Abdominal: Bowel sounds present x 4. No appreciable hepatosplenomegaly.  Skin: No jaundice, rashes, or visible lesions.  Neuro:  --GCS: E4 V4 M6  --Mental Status:  awake, oriented to self and place, disoriented to situation and time, follows simple commands  --CN II-XII grossly intact.   --Pupils 3mm, PERRL.   --Corneal reflex, gag, cough intact.  --STANLEY spont       Medications:  Continuous Scheduled[START ON 8/19/2023] aspirin, 81 mg, Daily  levETIRAcetam, 1,500 mg, BID  lisinopriL, 10 mg, Daily  mupirocin, , BID  rivaroxaban, 20 mg, Daily with dinner  [START ON 8/19/2023] senna-docusate 8.6-50 mg, 1 tablet, Daily    PRNacetaminophen, 650 mg, Q6H PRN  hydrALAZINE, 10 mg, Q6H PRN  labetalol, 10 mg, Q6H PRN  magnesium oxide, 800 mg, PRN  magnesium oxide, 800 mg, PRN  potassium bicarbonate, 35 mEq, PRN  potassium bicarbonate, 50 mEq, PRN  potassium bicarbonate, 60 mEq, PRN  potassium, sodium phosphates, 2 packet, PRN  potassium, sodium phosphates, 2 packet, PRN  potassium, sodium phosphates, 2 packet, PRN  sodium chloride 0.9%, 10 mL, PRN      Today I personally reviewed pertinent medications, lines/drains/airways, imaging, cardiology results, laboratory results, microbiology results,     Diet  No diet orders on file        Assessment/Plan:     Neuro  * Status epilepticus  63M with epilepsy maintained on Keppra 750mg QID, DVT on Xarelto s/p IVC filter placement who presents as a transfer from OSH for concern of status epilepticus. Intubated at OSH due to low GCS. Received 4.5g Keppra load and started on Propofol and Fentanyl. CTH with chronic small vessel ischemic change but without acute findings. CT Chest with opacification of right lower lobe, and fusiform dilation of the ascending thoracic aorta (4.2 cm).  Initial Lactate 4.9, Ammonia 43, UDS positive for amphetamines and benzodiazepines (given per EMS).     Neuro   - Admit to NCC  - Q1h  neurochecks while in ICU   - moves all limbs spontaneously when woken, does not follow commands  - Q1h vitals while in ICU  - HOB@30  - Continue Keppra 1500mg BID  - MRI Brain w/o acute intracranial pathology   - Cap EEG overnight, formal hook up today  - Propofol @35  - Fentanyl to maintain RASS 0  8/18/2023: remained extubated overnight, o2 sats stable       Cards  - SBP <180  - EKG  - initiated Lisinopril      Renal  - Strict I/Os  - external catheter in place   - bladder scan q6h     FEN/GI  - Daily CMP, Mag, Phos - replete electrolytes PRN  - start trickle feeds today, consult nutrition     Endo  - TSH 25   - repeat TSH elevated   - T3 and T4 wnl     H/O  - Daily CBC, transfuse PRN  - ASA daily   - Xarelto restarted today for hx of DVT        Other  - PT/OT/SLP as appropriate  - CM/SW consult for dispo planning          The patient is being Prophylaxed for:  Venous Thromboembolism with: Chemical  Stress Ulcer with: None  Ventilator Pneumonia with: not applicable    Activity Orders          Turn patient every 2 hours starting at 08/16 0800    Progressive Mobility Protocol (mobilize patient to their highest level of functioning at least twice daily) starting at 08/15 2000        Full Code    Norma Case NP  Neurocritical Care  [facility] Neuro Critical [facility]

## 2023-08-18 NOTE — PLAN OF CARE
Problem: SLP  Goal: SLP Goal  Description: Speech Language Pathology Goals  Goals expected to be met by 9/15:    1. The patient will participate in ongoing swallow evaluation to determine least restrictive diet.  2. The patient will participate in a cognitive-linguistic evaluation to determine therapeutic needs.     Outcome: Ongoing, Progressing       Bedside evaluation completed. ST recommends pt to remain NPO with strict aspiration precautions. ST will continue to follow.

## 2023-08-18 NOTE — SUBJECTIVE & OBJECTIVE
Review of Systems  Unable to obtain a complete ROS due to level of consciousness.  Objective:     Vitals:  Temp: 98.2 °F (36.8 °C)  Pulse: 90  Rhythm: normal sinus rhythm  BP: (!) 177/83  MAP (mmHg): 119  Resp: 19  SpO2: 97 %    Temp  Min: 98 °F (36.7 °C)  Max: 98.4 °F (36.9 °C)  Pulse  Min: 69  Max: 99  BP  Min: 113/63  Max: 200/96  MAP (mmHg)  Min: 82  Max: 141  Resp  Min: 12  Max: 36  SpO2  Min: 93 %  Max: 98 %  Oxygen Concentration (%)  Min: 30  Max: 30    08/17 0701 - 08/18 0700  In: 453.4 [P.O.:30; I.V.:182]  Out: 1105 [Urine:1105]   Unmeasured Output  Urine Occurrence: 1        Physical Exam  GA: Alert, comfortable, no acute distress.   HEENT: No scleral icterus or JVD.   Pulmonary: Clear to auscultation A/L.   Cardiac: RRR S1 & S2 w/o rubs/murmurs/gallops.   Abdominal: Bowel sounds present x 4. No appreciable hepatosplenomegaly.  Skin: No jaundice, rashes, or visible lesions.  Neuro:  --GCS: E4 V4 M6  --Mental Status:  awake, oriented to self and place, disoriented to situation and time, follows simple commands  --CN II-XII grossly intact.   --Pupils 3mm, PERRL.   --Corneal reflex, gag, cough intact.  --STANLEY spont       Medications:  Continuous Scheduled[START ON 8/19/2023] aspirin, 81 mg, Daily  levETIRAcetam, 1,500 mg, BID  lisinopriL, 10 mg, Daily  mupirocin, , BID  rivaroxaban, 20 mg, Daily with dinner  [START ON 8/19/2023] senna-docusate 8.6-50 mg, 1 tablet, Daily    PRNacetaminophen, 650 mg, Q6H PRN  hydrALAZINE, 10 mg, Q6H PRN  labetalol, 10 mg, Q6H PRN  magnesium oxide, 800 mg, PRN  magnesium oxide, 800 mg, PRN  potassium bicarbonate, 35 mEq, PRN  potassium bicarbonate, 50 mEq, PRN  potassium bicarbonate, 60 mEq, PRN  potassium, sodium phosphates, 2 packet, PRN  potassium, sodium phosphates, 2 packet, PRN  potassium, sodium phosphates, 2 packet, PRN  sodium chloride 0.9%, 10 mL, PRN      Today I personally reviewed pertinent medications, lines/drains/airways, imaging, cardiology results,  laboratory results, microbiology results,     Diet  No diet orders on file

## 2023-08-18 NOTE — ASSESSMENT & PLAN NOTE
63M with epilepsy maintained on Keppra 750mg QID, DVT on Xarelto s/p IVC filter placement who presents as a transfer from OSH for concern of status epilepticus. Intubated at OSH due to low GCS. Received 4.5g Keppra load and started on Propofol and Fentanyl. CTH with chronic small vessel ischemic change but without acute findings. CT Chest with opacification of right lower lobe, and fusiform dilation of the ascending thoracic aorta (4.2 cm).  Initial Lactate 4.9, Ammonia 43, UDS positive for amphetamines and benzodiazepines (given per EMS).     Neuro   - Admit to NCC  - Q1h neurochecks while in ICU   - moves all limbs spontaneously when woken, does not follow commands  - Q1h vitals while in ICU  - HOB@30  - Continue Keppra 1500mg BID  - MRI Brain w/o acute intracranial pathology   - Cap EEG overnight, formal hook up today  - Propofol @35  - Fentanyl to maintain RASS 0  8/18/2023: remained extubated overnight, o2 sats stable       Cards  - SBP <180  - EKG  - initiated Lisinopril      Renal  - Strict I/Os  - external catheter in place   - bladder scan q6h     FEN/GI  - Daily CMP, Mag, Phos - replete electrolytes PRN  - start trickle feeds today, consult nutrition     Endo  - TSH 25   - repeat TSH elevated   - T3 and T4 wnl     H/O  - Daily CBC, transfuse PRN  - ASA daily   - Xarelto restarted today for hx of DVT        Other  - PT/OT/SLP as appropriate  - CM/SW consult for dispo planning

## 2023-08-18 NOTE — PLAN OF CARE
[facility] Care Plan    POC reviewed with Sam Zamora and family at 1400. Pt verbalized understanding. Questions and concerns addressed. No acute events today. Pt progressing toward goals. Will continue to monitor. See below and flowsheets for full assessment and VS info.   - Started on PO BP meds  - PRNs for SBP >180 given around the clock today  - Speech passed for crushed meds in applesauce with thicken liquids for pleasure. Will try and see again over the weekend  - PT/OT worked with pt and was able to walk in the [place] today and tolerated well  - Wound care came by to see about redness to elbows  - family updated over the phone            Is this a stroke patient? no    Neuro:  [place] Coma Scale  Best Eye Response: 4-->(E4) spontaneous  Best Motor Response: 6-->(M6) obeys commands  Best Verbal Response: 5-->(V5) oriented  [place] Coma Scale Score: 15  Assessment Qualifiers: patient chemically sedated or paralyzed, patient intubated  Pupil PERRLA: yes     24 hr Temp:  [98 °F (36.7 °C)-98.4 °F (36.9 °C)]     CV:   Rhythm: normal sinus rhythm  BP goals:   SBP < 180  MAP > 65    Resp:      Vent Mode: Spont  Set Rate: 16 BPM  Oxygen Concentration (%): 30  Vt Set: 420 mL  PEEP/CPAP: 5 cmH20  Pressure Support: 5 cmH20    Plan: N/A    GI/:     Diet/Nutrition Received: NPO  Last Bowel Movement: 08/14/23  Voiding Characteristics: external catheter    Intake/Output Summary (Last 24 hours) at 8/18/2023 1633  Last data filed at 8/18/2023 1500  Gross per 24 hour   Intake 513.35 ml   Output 1430 ml   Net -916.65 ml     Unmeasured Output  Urine Occurrence: 1    Labs/Accuchecks:  Recent Labs   Lab 08/18/23  0109   WBC 4.95   RBC 4.14*   HGB 11.8*   HCT 34.7*   *      Recent Labs   Lab 08/18/23  0109   *   K 4.2   CO2 24      BUN 14   CREATININE 0.9   ALKPHOS 55   ALT 11   AST 34   BILITOT 0.8      Recent Labs   Lab 08/15/23  1145   INR 1.2   APTT 29.3      Recent Labs   Lab 08/15/23  1145   CPK 96        Electrolytes: N/A - electrolytes WDL  Accuchecks: none    Gtts:      LDA/Wounds:  Lines/Drains/Airways       Drain  Duration             Male External Urinary Catheter 08/16/23 0723 Medium 2 days              Peripheral Intravenous Line  Duration                  Peripheral IV - Single Lumen 08/15/23 1300 20 G Anterior;Distal;Left Forearm 3 days         Peripheral IV - Single Lumen 08/15/23 1500 18 G Anterior;Right Forearm 3 days         Peripheral IV - Single Lumen 08/15/23 1500 20 G Left Antecubital 3 days                  Wounds: Yes  Wound care consulted: Yes   Problem: Adult Inpatient Plan of Care  Goal: Plan of Care Review  Outcome: Ongoing, Progressing  Goal: Patient-Specific Goal (Individualized)  Outcome: Ongoing, Progressing  Goal: Absence of Hospital-Acquired Illness or Injury  Outcome: Ongoing, Progressing  Goal: Optimal Comfort and Wellbeing  Outcome: Ongoing, Progressing  Goal: Readiness for Transition of Care  Outcome: Ongoing, Progressing     Problem: Impaired Wound Healing  Goal: Optimal Wound Healing  Outcome: Ongoing, Progressing     Problem: Communication Impairment (Mechanical Ventilation, Invasive)  Goal: Effective Communication  Outcome: Ongoing, Progressing     Problem: Device-Related Complication Risk (Mechanical Ventilation, Invasive)  Goal: Optimal Device Function  Outcome: Met     Problem: Inability to Wean (Mechanical Ventilation, Invasive)  Goal: Mechanical Ventilation Liberation  Outcome: Met     Problem: Nutrition Impairment (Mechanical Ventilation, Invasive)  Goal: Optimal Nutrition Delivery  Outcome: Met     Problem: Skin and Tissue Injury (Mechanical Ventilation, Invasive)  Goal: Absence of Device-Related Skin and Tissue Injury  Outcome: Met     Problem: Ventilator-Induced Lung Injury (Mechanical Ventilation, Invasive)  Goal: Absence of Ventilator-Induced Lung Injury  Outcome: Met     Problem: Communication Impairment (Artificial Airway)  Goal: Effective  Communication  Outcome: Met     Problem: Device-Related Complication Risk (Artificial Airway)  Goal: Optimal Device Function  Outcome: Met     Problem: Skin and Tissue Injury (Artificial Airway)  Goal: Absence of Device-Related Skin or Tissue Injury  Outcome: Met     Problem: Noninvasive Ventilation Acute  Goal: Effective Unassisted Ventilation and Oxygenation  Outcome: Met     Problem: Fall Injury Risk  Goal: Absence of Fall and Fall-Related Injury  Outcome: Ongoing, Progressing     Problem: Restraint, Nonbehavioral (Nonviolent)  Goal: Absence of Harm or Injury  Outcome: Met     Problem: Skin Injury Risk Increased  Goal: Skin Health and Integrity  Outcome: Ongoing, Progressing

## 2023-08-18 NOTE — PROVIDER TRANSFER
Neuro Critical Care Transfer of Care note    Date of Admit: 8/15/2023  Date of Transfer / Stepdown: 8/18/2023    Brief History of Present Illness:      History of Present Illness: Sam Zamora is a 63 year old male with a medical history significant for epilepsy maintained on Keppra 750mg QID, DVT on Xarelto s/p IVC filter placement who presents as a transfer from OSH for concern of status epilepticus. Patient presented to OSH after being found down at home. EMS noted 3 witnessed seizure events without return to baseline. He was intubated at OSH due to low GCS. Received 4.5g Keppra load and started on Propofol and Fentanyl. CTH showed chronic small vessel ischemic change without acute findings. CT Chest with opacification of right lower lobe, and fusiform dilation of the ascending thoracic aorta (4.2 cm).  Lactate 4.9, Ammonia 43, UDS positive for amphetamines and benzodiazepines (given per EMS).      Recommendation was made to transfer patient to [facility] for higher level of care on continuous EEG monitoring.         Hospital Course: 8/17/2023 NAEON, EEG no seizures per epilepsy, wean sedation, SBT  8/18/2023: remained extubated overnight, start Lisinopril, plan to stepdown to floor       Hospital Course By Problem with Pertinent Findings:     Neuro  * Status epilepticus  63M with epilepsy maintained on Keppra 750mg QID, DVT on Xarelto s/p IVC filter placement who presents as a transfer from OSH for concern of status epilepticus. Intubated at OSH due to low GCS. Received 4.5g Keppra load and started on Propofol and Fentanyl. CTH with chronic small vessel ischemic change but without acute findings. CT Chest with opacification of right lower lobe, and fusiform dilation of the ascending thoracic aorta (4.2 cm).  Initial Lactate 4.9, Ammonia 43, UDS positive for amphetamines and benzodiazepines (given per EMS).      Neuro   - Admit to [facility]  - Q1h neurochecks while in ICU          - moves all limbs spontaneously when  woken, does not follow commands  - Q1h vitals while in ICU  - HOB@30  - Continue Keppra 1500mg BID  - MRI Brain w/o acute intracranial pathology   - Cap EEG overnight, formal hook up today  - Propofol @35  - Fentanyl to maintain RASS 0  8/18/2023: remained extubated overnight, o2 sats stable         Cards  - SBP <180  - EKG  - initiated Lisinopril        Renal  - Strict I/Os  - external catheter in place   - bladder scan q6h      FEN/GI  - Daily CMP, Mag, Phos - replete electrolytes PRN  - start trickle feeds today, consult nutrition      Endo  - TSH 25          - repeat TSH elevated          - T3 and T4 wnl      H/O  - Daily CBC, transfuse PRN  - ASA daily   - Xarelto restarted today for hx of DVT             Consultants and Procedures:     Consultants:  epilepsy    Procedures:     none    Transfer Information:     Diet:  Npo- crushed meds    Physical Activity:  PT/OT    To Do / Pending Studies / Follow ups:    Pending SLP re-assessment tomorrow (did not pass today for PO diet)  Monitor BP and optimize  Xarelto was initiated today for hx of DVT    Norma Case  Neuro Critical Care

## 2023-08-18 NOTE — PT/OT/SLP EVAL
"Occupational Therapy   Evaluation    Name: Sam Zamora  MRN: [id number]  Admitting Diagnosis: Status epilepticus  Recent Surgery: * No surgery found *      Recommendations:     Discharge Recommendations: home health OT  Discharge Equipment Recommendations:  shower chair  Barriers to discharge:  None    Assessment:     Sam Zamora is a 63 y.o. male with a medical diagnosis of Status epilepticus.  He presents with performance deficits affecting function: impaired endurance, impaired self care skills, impaired functional mobility.      Rehab Prognosis: Good; patient would benefit from acute skilled OT services to address these deficits and reach maximum level of function.       Plan:     Patient to be seen 4 x/week to address the above listed problems via self-care/home management, therapeutic activities, therapeutic exercises, neuromuscular re-education  Plan of Care Expires: 09/16/23  Plan of Care Reviewed with: patient    Subjective     Patient: "I must have fallen on my right shoulder during the seizure."      Occupational Profile:  Patient resides in [place] alone in first floor apt (below a raised mobile home) with 4 steps to enter, rail on right.  Ex-wife resides on 2nd floor of raised mobile home and is available to assist post discharge.  PTA patient independent with ADLs.  Currently owns no DME.  Works:  security.  Hobby:  TV.       Pain/Comfort:  Pain Rating 1: 6/10  Location - Side 1: Right  Location 1: shoulder  Pain Addressed 1: Reposition, Nurse notified  Pain Rating Post-Intervention 1: 3/10    Patients cultural, spiritual, Latter-day conflicts given the current situation: no    Objective:     Communicated with: Nurse prior to session.  Patient found supine with bed alarm, Condom Catheter, peripheral IV, oxygen, telemetry, pulse ox (continuous), blood pressure cuff upon OT entry to room.    General Precautions: Standard, aspiration, fall  Orthopedic Precautions: N/A  Braces: " N/A  Respiratory Status: 3 liters 02    Occupational Performance:    Bed Mobility:    Patient completed Rolling/Turning to Left with  supervision  Patient completed Rolling/Turning to Right with supervision  Patient completed Supine to Sit with supervision  Patient completed Sit to Supine with supervision    Functional Mobility/Transfers:  Patient completed Sit <> Stand Transfer with stand by assistance  with  no assistive device   Patient completed Bed <> Chair Transfer using Stand Pivot technique with contact guard assistance with no assistive device    Activities of Daily Living:  Grooming: stand by assistance while standing  Upper Body Dressing: minimum assistance to guide clothing around back 2* right shoulder pain  Lower Body Dressing: minimum assistance while seated EOB    Cognitive/Visual Perceptual:  Cognitive/Psychosocial Skills:     -       Oriented to: Person, Place, Time, and Situation   -       Follows Commands/attention:Follows one-step commands  -       Communication: clear/fluent    Physical Exam:  Postural examination/scapula alignment:    -       Rounded shoulders  Upper Extremity Range of Motion:     -       Right Upper Extremity: AAROM WNL  -       Left Upper Extremity: WNL  Upper Extremity Strength:    -       Right Upper Extremity: 3/5  -       Left Upper Extremity: WNL    AMPAC 6 Click ADL:  AMPAC Total Score: 18    Treatment & Education:  Patient education provided on role of OT.  Patient instructed on need to call for assistance for toileting needs and when getting up.  Continued education, patient/ family training recommended.  Patient alert and oriented x 3; able to follow 4/4 one step commands.  Patient attentive and interactive throughout the session.  Session was conducted separately from PT session to give more opportunities to mobilize throughout the day.    Patient left supine with all lines intact, call button in reach, and bed alarm on    GOALS:   Multidisciplinary Problems        Occupational Therapy Goals          Problem: Occupational Therapy    Goal Priority Disciplines Outcome Interventions   Occupational Therapy Goal     OT, PT/OT Ongoing, Progressing    Description: Goals set 8/18 to be addressed for 14 days with expiration date, 9/1:  Patient will increase functional independence with ADLs by performing:    Patient will demonstrate rolling to the right with modified independence.  Not met   Patient will demonstrate rolling to the left with modified independence.   Not met  Patient will demonstrate supine -sit with modified independence.   Not met  Patient will demonstrate stand pivot transfers with modified independence.   Not met  Patient will demonstrate grooming while standing with modified independence.   Not met  Patient will demonstrate upper body dressing with modified independence  while seated EOB.   Not met  Patient will demonstrate lower body dressing with modified independence while seated EOB.   Not met  Patient will demonstrate toileting with modified independence.   Not met  Patient's family / caregiver will demonstrate independence and safety with assisting patient with self-care skills and functional mobility.     Not met                               History:     Past Medical History:   Diagnosis Date    DVT (deep venous thrombosis)     Stroke     Venous insufficiency          Past Surgical History:   Procedure Laterality Date    evlt      GUADALUPE FILTER PLACEMENT      [place] EEG, W/VIDEO, CONT RECORD, I&R, >12<26 HRS  8/17/2023    REVISION TOTAL HIP ARTHROPLASTY Right        Time Tracking:     OT Date of Treatment: 08/18/23  OT Start Time: 0518  OT Stop Time: 0538  OT Total Time (min): 20 min    Billable Minutes:Evaluation 10  Therapeutic Activity 10    8/18/2023

## 2023-08-18 NOTE — PLAN OF CARE
Problem: Physical Therapy  Goal: Physical Therapy Goal  Description: Goals to be met by: 23     Patient will increase functional independence with mobility by performin. Supine to sit with [place] with HOB flat.  2. Sit to stand transfer with [place]  3. Gait  x 150 feet with [place] using No Assistive Device.   4. Ascend/descend 4 stair with right Handrails [place] using No Assistive Device.   5. Stand for 2 minutes with [place] using No Assistive Device to assist with ADLs.  6. Lower extremity exercise program x15 reps per handout, with independence    Outcome: Ongoing, Progressing     PT eval completed and goals established. Pt will begin PT POC, progressing as tolerated.

## 2023-08-18 NOTE — PT/OT/SLP EVAL
"Physical Therapy Evaluation    Patient Name:  Sam Zamora   MRN:  [id number]    Recommendations:     Discharge Recommendations: rehabilitation facility (may progress)   Discharge Equipment Recommendations: none   Barriers to discharge: None    Assessment:     Sam Zamora is a 63 y.o. male admitted with a medical diagnosis of Status epilepticus.  He presents with the following impairments/functional limitations: weakness, impaired endurance, gait instability, impaired balance, decreased lower extremity function, pain. Patient is baseline independent with mobility; currently he is functioning below prior level of mobility. He achieved Tc during ambulation and stair trials with fatigue and balance deficits impairing gait quality. Recommending discharge to [facility] to maximize function; may progress to home with  PT. Patient is safe to mobilize with nursing.    Rehab Prognosis: Good; patient would benefit from acute skilled PT services to address these deficits and reach maximum level of function.    Recent Surgery: * No surgery found *      Plan:     During this hospitalization, patient to be seen 4 x/week to address the identified rehab impairments via gait training, therapeutic activities, therapeutic exercises, neuromuscular re-education and progress toward the following goals:    Plan of Care Expires:  09/18/23    Subjective     Chief Complaint: R shoulder pain  Patient/Family Comments/goals: "Thank you so much" Patient mainly communicating with nods and gestures due to recent extubation.   Pain/Comfort:  Pain Rating 1: 5/10  Location - Side 1: Right  Location - Orientation 1: generalized  Location 1: shoulder  Pain Addressed 1: Cessation of Activity, Reposition  Pain Rating Post-Intervention 1: 5/10    Patients cultural, spiritual, Protestant conflicts given the current situation: no    Living Environment:  Patient lives in 1st story apt with 4STE with R HR. Ex-wife lives on second story and can assist if " need.  Prior to admission, patients level of function was independent.  Equipment used at home: none.  DME owned (not currently used): none.  Upon discharge, patient will have assistance from ex-wife.    Objective:     Communicated with RN prior to session.  Patient found HOB elevated with bed alarm, Condom Catheter, peripheral IV, telemetry, pulse ox (continuous), blood pressure cuff, oxygen  upon PT entry to room.    General Precautions: Standard, fall, aspiration  Orthopedic Precautions:N/A   Braces: N/A  Respiratory Status: Room air    Exams:  Cognitive Exam:  Patient is oriented to Person, Place, Time, and Situation  Sensation:    -       Intact  RLE ROM: WNL  RLE Strength: WNL  LLE ROM: WNL  LLE Strength: WNL    Functional Mobility:  Bed Mobility:     Scooting: supervision  Supine to Sit: minimum assistance at trunk. HOB elevated. Increased time needed to complete.   Sit to Supine: stand by assistance  Transfers:     Sit to Stand:  contact guard assistance with no AD  Gait: patient ambulated 100' with Tc/CGA no AD. Tc for balance.  Deviations Noted: decreased gait speed, decreased step height R,L, decreased step length R, L, and increased lateral sway R, L. Increased deviations at end of ambulation following stair trial.   Stairs:  patient navigated 3 stairs Tc with R handrail. Step to pattern to R up, step to pattern down to L. Assist for balance and safety.     Vitals  93 BPM following ambulation trial. Patient reported fatigue.       AM-PAC 6 CLICK MOBILITY  Total Score:20       Treatment & Education:  Education: patient educated on POC, need for therapy, safety with mobility, discharge recommendations and equipment recommendations. Patient verbalized understanding of all topics.   Cues during gait for increased step length and decreased sway.    Patient left HOB elevated with all lines intact, call button in reach, bed alarm on, RN notified, and SLP providers present.    GOALS:   Multidisciplinary  Problems       Physical Therapy Goals          Problem: Physical Therapy    Goal Priority Disciplines Outcome Goal Variances Interventions   Physical Therapy Goal     PT, PT/OT Ongoing, Progressing     Description: Goals to be met by: 23     Patient will increase functional independence with mobility by performin. Supine to sit with [place] with HOB flat.  2. Sit to stand transfer with [place]  3. Gait  x 150 feet with [place] using No Assistive Device.   4. Ascend/descend 4 stair with right Handrails [place] using No Assistive Device.   5. Stand for 2 minutes with [place] using No Assistive Device to assist with ADLs.  6. Lower extremity exercise program x15 reps per handout, with independence                         History:     Past Medical History:   Diagnosis Date    DVT (deep venous thrombosis)     Stroke     Venous insufficiency        Past Surgical History:   Procedure Laterality Date    evlt      GUADALUPE FILTER PLACEMENT      [place] EEG, W/VIDEO, CONT RECORD, I&R, >12<26 HRS  2023    REVISION TOTAL HIP ARTHROPLASTY Right        Time Tracking:     PT Received On: 23  PT Start Time: 920     PT Stop Time: 945  PT Total Time (min): 25 min     Billable Minutes: Evaluation 10 minutes and Gait Training 15 minutes      2023

## 2023-08-19 PROBLEM — I10 HTN (HYPERTENSION): Status: ACTIVE | Noted: 2023-08-19

## 2023-08-19 PROBLEM — G93.40 ENCEPHALOPATHY: Status: RESOLVED | Noted: 2023-08-16 | Resolved: 2023-08-19

## 2023-08-19 PROBLEM — Z97.8 ENDOTRACHEALLY INTUBATED: Status: RESOLVED | Noted: 2023-08-15 | Resolved: 2023-08-19

## 2023-08-19 PROBLEM — Z99.11 ON MECHANICALLY ASSISTED VENTILATION: Status: RESOLVED | Noted: 2023-08-15 | Resolved: 2023-08-19

## 2023-08-19 LAB
ALBUMIN SERPL BCP-MCNC: 2.9 G/DL (ref 3.5–5.2)
ALP SERPL-CCNC: 57 U/L (ref 55–135)
ALT SERPL W/O P-5'-P-CCNC: 11 U/L (ref 10–44)
ANION GAP SERPL CALC-SCNC: 10 MMOL/L (ref 8–16)
AST SERPL-CCNC: 52 U/L (ref 10–40)
BASOPHILS # BLD AUTO: 0.01 K/UL (ref 0–0.2)
BASOPHILS NFR BLD: 0.2 % (ref 0–1.9)
BILIRUB SERPL-MCNC: 0.7 MG/DL (ref 0.1–1)
BUN SERPL-MCNC: 12 MG/DL (ref 8–23)
CALCIUM SERPL-MCNC: 8.8 MG/DL (ref 8.7–10.5)
CHLORIDE SERPL-SCNC: 101 MMOL/L (ref 95–110)
CO2 SERPL-SCNC: 24 MMOL/L (ref 23–29)
CREAT SERPL-MCNC: 0.7 MG/DL (ref 0.5–1.4)
DIFFERENTIAL METHOD: ABNORMAL
EOSINOPHIL # BLD AUTO: 0.1 K/UL (ref 0–0.5)
EOSINOPHIL NFR BLD: 2.2 % (ref 0–8)
ERYTHROCYTE [DISTWIDTH] IN BLOOD BY AUTOMATED COUNT: 13.5 % (ref 11.5–14.5)
EST. GFR  (NO RACE VARIABLE): >60 ML/MIN/1.73 M^2
GLUCOSE SERPL-MCNC: 91 MG/DL (ref 70–110)
HCT VFR BLD AUTO: 40 % (ref 40–54)
HGB BLD-MCNC: 13.6 G/DL (ref 14–18)
IMM GRANULOCYTES # BLD AUTO: 0.01 K/UL (ref 0–0.04)
IMM GRANULOCYTES NFR BLD AUTO: 0.2 % (ref 0–0.5)
LYMPHOCYTES # BLD AUTO: 0.8 K/UL (ref 1–4.8)
LYMPHOCYTES NFR BLD: 16.2 % (ref 18–48)
MAGNESIUM SERPL-MCNC: 2 MG/DL (ref 1.6–2.6)
MCH RBC QN AUTO: 28.4 PG (ref 27–31)
MCHC RBC AUTO-ENTMCNC: 34 G/DL (ref 32–36)
MCV RBC AUTO: 84 FL (ref 82–98)
MONOCYTES # BLD AUTO: 0.5 K/UL (ref 0.3–1)
MONOCYTES NFR BLD: 9.8 % (ref 4–15)
NEUTROPHILS # BLD AUTO: 3.5 K/UL (ref 1.8–7.7)
NEUTROPHILS NFR BLD: 71.4 % (ref 38–73)
NRBC BLD-RTO: 0 /100 WBC
PHOSPHATE SERPL-MCNC: 2.4 MG/DL (ref 2.7–4.5)
PLATELET # BLD AUTO: 173 K/UL (ref 150–450)
PMV BLD AUTO: 10.1 FL (ref 9.2–12.9)
POTASSIUM SERPL-SCNC: 4 MMOL/L (ref 3.5–5.1)
PROT SERPL-MCNC: 7.2 G/DL (ref 6–8.4)
RBC # BLD AUTO: 4.79 M/UL (ref 4.6–6.2)
SODIUM SERPL-SCNC: 135 MMOL/L (ref 136–145)
WBC # BLD AUTO: 4.89 K/UL (ref 3.9–12.7)

## 2023-08-19 PROCEDURE — 25000003 PHARM REV CODE 250: Performed by: NURSE PRACTITIONER

## 2023-08-19 PROCEDURE — 83735 ASSAY OF MAGNESIUM: CPT | Performed by: STUDENT IN AN ORGANIZED HEALTH CARE EDUCATION/TRAINING PROGRAM

## 2023-08-19 PROCEDURE — 21400001 HC TELEMETRY ROOM

## 2023-08-19 PROCEDURE — 80053 COMPREHEN METABOLIC PANEL: CPT | Performed by: STUDENT IN AN ORGANIZED HEALTH CARE EDUCATION/TRAINING PROGRAM

## 2023-08-19 PROCEDURE — 20600001 HC STEP DOWN PRIVATE ROOM

## 2023-08-19 PROCEDURE — 25000003 PHARM REV CODE 250: Performed by: STUDENT IN AN ORGANIZED HEALTH CARE EDUCATION/TRAINING PROGRAM

## 2023-08-19 PROCEDURE — 94761 N-INVAS EAR/PLS OXIMETRY MLT: CPT

## 2023-08-19 PROCEDURE — 99232 SBSQ HOSP IP/OBS MODERATE 35: CPT | Mod: ,,, | Performed by: STUDENT IN AN ORGANIZED HEALTH CARE EDUCATION/TRAINING PROGRAM

## 2023-08-19 PROCEDURE — 99232 PR SUBSEQUENT HOSPITAL CARE,LEVL II: ICD-10-PCS | Mod: ,,, | Performed by: STUDENT IN AN ORGANIZED HEALTH CARE EDUCATION/TRAINING PROGRAM

## 2023-08-19 PROCEDURE — 85025 COMPLETE CBC W/AUTO DIFF WBC: CPT | Performed by: INTERNAL MEDICINE

## 2023-08-19 PROCEDURE — 84100 ASSAY OF PHOSPHORUS: CPT | Performed by: STUDENT IN AN ORGANIZED HEALTH CARE EDUCATION/TRAINING PROGRAM

## 2023-08-19 RX ADMIN — RIVAROXABAN 20 MG: 10 TABLET, FILM COATED ORAL at 04:08

## 2023-08-19 RX ADMIN — MUPIROCIN: 20 OINTMENT TOPICAL at 09:08

## 2023-08-19 RX ADMIN — LEVETIRACETAM 1500 MG: 500 TABLET, FILM COATED ORAL at 08:08

## 2023-08-19 RX ADMIN — MUPIROCIN: 20 OINTMENT TOPICAL at 08:08

## 2023-08-19 RX ADMIN — LISINOPRIL 10 MG: 10 TABLET ORAL at 08:08

## 2023-08-19 RX ADMIN — ACETAMINOPHEN 650 MG: 325 TABLET ORAL at 01:08

## 2023-08-19 RX ADMIN — SODIUM PHOSPHATE, MONOBASIC, MONOHYDRATE AND SODIUM PHOSPHATE, DIBASIC, ANHYDROUS 15 MMOL: 142; 276 INJECTION, SOLUTION INTRAVENOUS at 07:08

## 2023-08-19 RX ADMIN — ASPIRIN 81 MG 81 MG: 81 TABLET ORAL at 08:08

## 2023-08-19 RX ADMIN — ACETAMINOPHEN 650 MG: 325 TABLET ORAL at 10:08

## 2023-08-19 RX ADMIN — LEVETIRACETAM 1500 MG: 500 TABLET, FILM COATED ORAL at 10:08

## 2023-08-19 NOTE — PROGRESS NOTES
[facility] - Intensive Care ([address] Medicine  Progress Note    Patient Name: Sam Zamora  MRN: [id number]  Patient Class: IP- Inpatient   Admission Date: 8/15/2023  Length of Stay: 4 days  Attending Physician: Cheryle Watts MD  Primary Care Provider: No primary care provider on file.        Subjective:     Principal Problem:Status epilepticus        HPI:  Sam Zamora is a 63 year old male with a medical history significant for epilepsy maintained on Keppra 750mg QID, DVT on Xarelto s/p IVC filter placement who presents as a transfer from OSH for concern of status epilepticus. Patient presented to OSH after being found down at home. EMS noted 3 witnessed seizure events without return to baseline. He was intubated at OSH due to low GCS. Received 4.5g Keppra load and started on Propofol and Fentanyl. CTH showed chronic small vessel ischemic change without acute findings. CT Chest with opacification of right lower lobe, and fusiform dilation of the ascending thoracic aorta (4.2 cm).  Lactate 4.9, Ammonia 43, UDS positive for amphetamines and benzodiazepines (given per EMS).      Recommendation was made to [place] [facility] [place] of [place] on continuous EEG monitoring.       Overview/Hospital Course:  Admitted to [facility]. EEG no seizures per epilepsy, weaned sedation, passed SBT and extubated. Remained stable post-extubation and stepped down to floor.         Interval History: Patient stepped down to floor from [facility]. Stable on RA, denies further seizure activity.  Patient reports missing doses of home seizure medication as cause of seizure. No other sxs.     Failed swallow with SLP yesterday, only cleared for thickened liquids for pleasure. SLP to follow.     PT/OT ordered.     Review of Systems   Constitutional:  Negative for chills and fever.   Respiratory:  Negative for shortness of breath.    Cardiovascular:  Negative for chest pain.   Gastrointestinal:  Negative for abdominal  pain.   Neurological:  Negative for seizures and headaches.   Psychiatric/Behavioral:  Negative for confusion.      Objective:     Vital Signs (Most Recent):  Temp: 98.1 °F (36.7 °C) (08/19/23 1552)  Pulse: 88 (08/19/23 1552)  Resp: 18 (08/19/23 1552)  BP: 119/63 (08/19/23 1552)  SpO2: 100 % (08/19/23 1552) Vital Signs (24h Range):  Temp:  [98 °F (36.7 °C)-98.4 °F (36.9 °C)] 98.1 °F (36.7 °C)  Pulse:  [] 88  Resp:  [16-26] 18  SpO2:  [95 %-100 %] 100 %  BP: (119-182)/(63-94) 119/63     Weight: 80 kg (176 lb 5.9 oz)  Body mass index is 22.64 kg/m².  No intake or output data in the 24 hours ending 08/19/23 1831      Physical Exam  Vitals and nursing note reviewed.   Constitutional:       General: He is not in acute distress.     Appearance: Normal appearance.   HENT:      Head: Normocephalic and atraumatic.      Nose: Nose normal.      Mouth/Throat:      Mouth: Mucous membranes are moist.      Pharynx: Oropharynx is clear.   Eyes:      Extraocular Movements: Extraocular movements intact.      Conjunctiva/sclera: Conjunctivae normal.      Pupils: Pupils are equal, round, and reactive to light.   Cardiovascular:      Rate and Rhythm: Normal rate and regular rhythm.      Pulses: Normal pulses.      Heart sounds: Normal heart sounds.   Pulmonary:      Effort: Pulmonary effort is normal.      Breath sounds: Normal breath sounds.   Abdominal:      General: Abdomen is flat. Bowel sounds are normal. There is no distension.      Palpations: Abdomen is soft.      Tenderness: There is no abdominal tenderness.   Genitourinary:     Comments: Condom cath in place  Musculoskeletal:         General: No swelling. Normal range of motion.      Cervical back: Normal range of motion and neck supple.      Comments: Dressing on right elbow   Skin:     General: Skin is warm and dry.   Neurological:      General: No focal deficit present.      Mental Status: He is alert and oriented to person, place, and time.   Psychiatric:          Mood and Affect: Mood normal.         Behavior: Behavior normal.             Significant Labs: All pertinent labs within the past 24 hours have been reviewed.    Significant Imaging: I have reviewed all pertinent imaging results/findings within the past 24 hours.      Assessment/Plan:      * Status epilepticus  - epilepsy maintained on Keppra 750mg QID  - patient reports missing home doses of keppra prior to onset of seizure. Denies any other sxs as seizure cause.   - 3 witnessed seizure events without return to baseline. He was intubated at OSH due to low GCS. Received 4.5g Keppra load and started on Propofol and Fentanyl.  - CTH showed chronic small vessel ischemic change without acute findings.   - MRI Brain w/o acute intracranial pathology   - EEG done: generalized background slowing consistent with diffuse cortical dysfunction and a moderate encephalopathy with evidence of subcortical/deep midline dysfunction bilaterally.  This finding is nonspecific with regards to etiology but can be seen in the setting of toxic/metabolic derangements, infection, and as a medication effect.  There are no prominent focal findings however encephalopathy obscures focal findings.  There are no pushbutton activations, no epileptiform discharges, and no electrographic seizures  - extubated and stable on RA  - cont keppra  1500 BID  - neuro checks q4      HTN (hypertension)  - cont lisinopril   - monitor BP and adjust regimen as needed      Aspiration pneumonia of right lower lobe  - 2/2 seizure  - stable on RA, afebrile   - monitor   - aspiration precautions  - SLP following       Hyperammonemia  - reolved      Elevated lactic acid level  - elevated on admit, likely 2/2 to seizure  - resolved       Chronic anticoagulation  - cont xarelto      History of DVT in adulthood  - cont xarelto       Presence of IVC filter  - noted  - cont xarelto      Nonintractable epilepsy with status epilepticus  - see status epilepticus         VTE  Risk Mitigation (From admission, onward)         Ordered     rivaroxaban tablet 20 mg  With dinner         08/18/23 1035     Place sequential compression device  Until discontinued         08/15/23 2007     IP VTE LOW RISK PATIENT  Once         08/15/23 2007                Discharge Planning   LEXII: 8/20/2023     Code Status: Full Code   Is the patient medically ready for discharge?:     Reason for patient still in hospital (select all that apply): Patient trending condition and PT / OT recommendations  Discharge Plan A: Rehab                  Cheryle Watts MD  Department of Hospital Medicine   [facility] - Intensive Care ([place])

## 2023-08-19 NOTE — NURSING
Nurses Note -- 4 Eyes      8/19/2023   4:22 PM      Skin assessed during: Transfer  From Neuro ICU    [] No Altered Skin Integrity Present    []Prevention Measures Documented      [x] Yes- Altered Skin Integrity Present or Discovered   [x] LDA Added if Not in Epic (Describe Wound)   [x] New Altered Skin Integrity was Present on Admit and Documented in LDA   [x] Wound Image Taken  Left elbow - red blanchable   Right elbow - Red nonblanchable   L medial ankle - scab  R lateral ankle - yellow open wound bed     Wound Care Consulted? Yes  Previously consulted in ICU     Attending Nurse:  Omid Johnson RN/Staff Member:  Latesha LEDBETTER

## 2023-08-19 NOTE — HOSPITAL COURSE
Admitted to [facility]. EEG no seizures per epilepsy, weaned sedation, passed SBT and extubated. Remained stable post-extubation and stepped down to floor.   No further seizures, cont keppra at discharge, f/u neurology.   PT/OT/SLP ordered for HH

## 2023-08-19 NOTE — PLAN OF CARE
Hospital Medicine ICU Acceptance Note  Date of Admit: 8/15/2023  Date of Transfer / Stepdown: 8/18/2023  ICU team stepping patient down: [facility]  ICU team member giving verbal handoff: Norma Yang    Accepting  team: MARIAMA    Brief History of Present Illness:      Sam Zamora is a 63 year old male with a medical history significant for epilepsy maintained on Keppra 750mg QID, DVT on Xarelto s/p IVC filter placement who presents as a transfer from OSH for concern of status epilepticus. Patient presented to OSH after being found down at home. EMS noted 3 witnessed seizure events without return to baseline. He was intubated at OSH due to low GCS. Received 4.5g Keppra load and started on Propofol and Fentanyl. CTH showed chronic small vessel ischemic change without acute findings. CT Chest with opacification of right lower lobe, and fusiform dilation of the ascending thoracic aorta (4.2 cm).  Lactate 4.9, Ammonia 43, UDS positive for amphetamines and benzodiazepines (given per EMS).      Recommendation was made to transfer patient to [facility] for higher level of care on continuous EEG monitoring.    Hospital/ICU Course:     Hospital Course: 8/17/2023 NAEON, EEG no seizures per epilepsy, wean sedation, SBT  8/18/2023: remained extubated overnight, start Lisinopril, plan to stepdown to floor    Neuro  * Status epilepticus  63M with epilepsy maintained on Keppra 750mg QID, DVT on Xarelto s/p IVC filter placement who presents as a transfer from OSH for concern of status epilepticus. Intubated at OSH due to low GCS. Received 4.5g Keppra load and started on Propofol and Fentanyl. CTH with chronic small vessel ischemic change but without acute findings. CT Chest with opacification of right lower lobe, and fusiform dilation of the ascending thoracic aorta (4.2 cm).  Initial Lactate 4.9, Ammonia 43, UDS positive for amphetamines and benzodiazepines (given per EMS).      Neuro   - Admit to [facility]  - [facility] neurochecks while in ICU           - moves all limbs spontaneously when woken, does not follow commands  - Q1h vitals while in ICU  - HOB@30  - Continue Keppra 1500mg BID  - MRI Brain w/o acute intracranial pathology   - Cap EEG overnight, formal hook up today  - Propofol @35  - Fentanyl to maintain RASS 0  8/18/2023: remained extubated overnight, o2 sats stable         Cards  - SBP <180  - EKG  - initiated Lisinopril        Renal  - Strict I/Os  - external catheter in place   - bladder scan q6h      FEN/GI  - Daily CMP, Mag, Phos - replete electrolytes PRN  - start trickle feeds today, consult nutrition      Endo  - TSH 25          - repeat TSH elevated          - T3 and T4 wnl      H/O  - Daily CBC, transfuse PRN  - ASA daily   - Xarelto restarted today for hx of DVT      Consultants and Procedures:     Consultants:  epilepsy    Procedures:    none    Transfer Information:     Diet:  NPO- crushed meds    Physical Activity:  PT/OT    To Do / Pending Studies / Follow ups:  Pending SLP re-assessment tomorrow (did not pass today for PO diet)  Monitor BP and optimize  Xarelto was initiated today for hx of DVT    Patient has been accepted by Hospital Medicine Team D, who will assume care of the patient upon arrival to the floor from the ICU. Please contact ICU team with any concerns prior to arrival. Please contact Hospital Medicine at [phone] or [phone] (please do NOT leave a voicemail) when patient arrives to the floor.

## 2023-08-19 NOTE — ASSESSMENT & PLAN NOTE
- 2/2 seizure  - stable on RA, afebrile   - monitor   - aspiration precautions  - SLP following

## 2023-08-19 NOTE — NURSING TRANSFER
Nursing Transfer Note      8/19/2023   9:00 AM    Nurse giving handoff:KHLOE Gabriel  Nurse receiving handoff:KHLOE Anne    Reason patient is being transferred: Stepdown to Hospital Med    Transfer From: Neuro ICU 9083 to [facility] Room [address]    Transfer via wheelchair    Transfer with cardiac monitoring    Transported by KHLOE Chavis    Transfer Vital Signs:  Blood Pressure:176/90  Heart Rate:83  O2:95  Temperature:98.3  Respirations:18    Telemetry: Box Number [id number]  / NSR HR 83  Order for Tele Monitor? Yes        4eyes on Skin: yes    Medicines sent: Mupirocin ointment    Any special needs or follow-up needed: N/A    Patient belongings transferred with patient: Yes    Chart send with patient: Yes    Notified: Patient declined family notify of transfer.     Patient reassessed at: 8/19 @ 0900     Upon arrival to floor: cardiac monitor applied, patient oriented to room, call bell in reach, and bed in lowest position

## 2023-08-19 NOTE — SUBJECTIVE & OBJECTIVE
Interval History: Patient stepped down to floor from [facility]. Stable on RA, denies further seizure activity.  Patient reports missing doses of home seizure medication as cause of seizure. No other sxs.     Failed swallow with SLP yesterday, only cleared for thickened liquids for pleasure. SLP to follow.     PT/OT ordered.     Review of Systems   Constitutional:  Negative for chills and fever.   Respiratory:  Negative for shortness of breath.    Cardiovascular:  Negative for chest pain.   Gastrointestinal:  Negative for abdominal pain.   Neurological:  Negative for seizures and headaches.   Psychiatric/Behavioral:  Negative for confusion.      Objective:     Vital Signs (Most Recent):  Temp: 98.1 °F (36.7 °C) (08/19/23 1552)  Pulse: 88 (08/19/23 1552)  Resp: 18 (08/19/23 1552)  BP: 119/63 (08/19/23 1552)  SpO2: 100 % (08/19/23 1552) Vital Signs (24h Range):  Temp:  [98 °F (36.7 °C)-98.4 °F (36.9 °C)] 98.1 °F (36.7 °C)  Pulse:  [] 88  Resp:  [16-26] 18  SpO2:  [95 %-100 %] 100 %  BP: (119-182)/(63-94) 119/63     Weight: 80 kg (176 lb 5.9 oz)  Body mass index is 22.64 kg/m².  No intake or output data in the 24 hours ending 08/19/23 1831      Physical Exam  Vitals and nursing note reviewed.   Constitutional:       General: He is not in acute distress.     Appearance: Normal appearance.   HENT:      Head: Normocephalic and atraumatic.      Nose: Nose normal.      Mouth/Throat:      Mouth: Mucous membranes are moist.      Pharynx: Oropharynx is clear.   Eyes:      Extraocular Movements: Extraocular movements intact.      Conjunctiva/sclera: Conjunctivae normal.      Pupils: Pupils are equal, round, and reactive to light.   Cardiovascular:      Rate and Rhythm: Normal rate and regular rhythm.      Pulses: Normal pulses.      Heart sounds: Normal heart sounds.   Pulmonary:      Effort: Pulmonary effort is normal.      Breath sounds: Normal breath sounds.   Abdominal:      General: Abdomen is flat. Bowel sounds are normal.  There is no distension.      Palpations: Abdomen is soft.      Tenderness: There is no abdominal tenderness.   Genitourinary:     Comments: Condom cath in place  Musculoskeletal:         General: No swelling. Normal range of motion.      Cervical back: Normal range of motion and neck supple.      Comments: Dressing on right elbow   Skin:     General: Skin is warm and dry.   Neurological:      General: No focal deficit present.      Mental Status: He is alert and oriented to person, place, and time.   Psychiatric:         Mood and Affect: Mood normal.         Behavior: Behavior normal.             Significant Labs: All pertinent labs within the past 24 hours have been reviewed.    Significant Imaging: I have reviewed all pertinent imaging results/findings within the past 24 hours.

## 2023-08-19 NOTE — PLAN OF CARE
[facility] Care Plan    POC reviewed with Sam CAT Sera and family at 0300. Pt verbalized understanding. Questions and concerns addressed. No acute events overnight. Pt progressing toward goals. Will continue to monitor. See below and flowsheets for full assessment and VS info.     TF orders      Is this a stroke patient? no    Neuro:  Prabha Coma Scale  Best Eye Response: 4-->(E4) spontaneous  Best Motor Response: 6-->(M6) obeys commands  Best Verbal Response: 5-->(V5) oriented  Prabha Coma Scale Score: 15  Assessment Qualifiers: patient chemically sedated or paralyzed, patient intubated  Pupil PERRLA: yes     24hr Temp:  [98 °F (36.7 °C)-98.4 °F (36.9 °C)]     CV:   Rhythm: normal sinus rhythm  BP goals:   SBP < 180  MAP > 65    Resp:      Vent Mode: Spont  Set Rate: 16 BPM  Oxygen Concentration (%): 30  Vt Set: 420 mL  PEEP/CPAP: 5 cmH20  Pressure Support: 5 cmH20    Plan: N/A    GI/:     Diet/Nutrition Received: NPO  Last Bowel Movement: 08/19/23  Voiding Characteristics: external catheter    Intake/Output Summary (Last 24 hours) at 8/19/2023 0741  Last data filed at 8/18/2023 1700  Gross per 24 hour   Intake 160 ml   Output 425 ml   Net -265 ml     Unmeasured Output  Urine Occurrence: 1  Stool Occurrence: 1  Pad Count: 3    Labs/Accuchecks:  Recent Labs   Lab 08/19/23  0050   WBC 4.89   RBC 4.79   HGB 13.6*   HCT 40.0         Recent Labs   Lab 08/19/23  0050   *   K 4.0   CO2 24      BUN 12   CREATININE 0.7   ALKPHOS 57   ALT 11   AST 52*   BILITOT 0.7      Recent Labs   Lab 08/15/23  1145   INR 1.2   APTT 29.3      Recent Labs   Lab 08/15/23  1145   CPK 96       Electrolytes: N/A - electrolytes WDL  Accuchecks: none    Gtts:      LDA/Wounds:  Lines/Drains/Airways       Drain  Duration             Male External Urinary Catheter 08/16/23 0723 Medium 3 days              Peripheral Intravenous Line  Duration                  Peripheral IV - Single Lumen 08/15/23 1300 20 G Anterior;Distal;Left  Forearm 3 days         Peripheral IV - Single Lumen 08/15/23 1500 18 G Anterior;Right Forearm 3 days         Peripheral IV - Single Lumen 08/15/23 1500 20 G Left Antecubital 3 days                  Wounds: Yes  Wound care consulted: Yes

## 2023-08-19 NOTE — ASSESSMENT & PLAN NOTE
- epilepsy maintained on Keppra 750mg QID  - patient reports missing home doses of keppra prior to onset of seizure. Denies any other sxs as seizure cause.   - 3 witnessed seizure events without return to baseline. He was intubated at OSH due to low GCS. Received 4.5g Keppra load and started on Propofol and Fentanyl.  - CTH showed chronic small vessel ischemic change without acute findings.   - MRI Brain w/o acute intracranial pathology   - EEG done: generalized background slowing consistent with diffuse cortical dysfunction and a moderate encephalopathy with evidence of subcortical/deep midline dysfunction bilaterally.  This finding is nonspecific with regards to etiology but can be seen in the setting of toxic/metabolic derangements, infection, and as a medication effect.  There are no prominent focal findings however encephalopathy obscures focal findings.  There are no pushbutton activations, no epileptiform discharges, and no electrographic seizures  - extubated and stable on RA  - cont keppra  1500 BID  - neuro checks q4

## 2023-08-20 LAB
ALBUMIN SERPL BCP-MCNC: 2.9 G/DL (ref 3.5–5.2)
ALP SERPL-CCNC: 52 U/L (ref 55–135)
ALT SERPL W/O P-5'-P-CCNC: 22 U/L (ref 10–44)
ANION GAP SERPL CALC-SCNC: 9 MMOL/L (ref 8–16)
AST SERPL-CCNC: 91 U/L (ref 10–40)
BASOPHILS # BLD AUTO: 0.03 K/UL (ref 0–0.2)
BASOPHILS NFR BLD: 0.7 % (ref 0–1.9)
BILIRUB SERPL-MCNC: 0.5 MG/DL (ref 0.1–1)
BUN SERPL-MCNC: 12 MG/DL (ref 8–23)
CALCIUM SERPL-MCNC: 9 MG/DL (ref 8.7–10.5)
CHLORIDE SERPL-SCNC: 102 MMOL/L (ref 95–110)
CO2 SERPL-SCNC: 24 MMOL/L (ref 23–29)
CREAT SERPL-MCNC: 0.6 MG/DL (ref 0.5–1.4)
DIFFERENTIAL METHOD: ABNORMAL
EOSINOPHIL # BLD AUTO: 0.2 K/UL (ref 0–0.5)
EOSINOPHIL NFR BLD: 4.3 % (ref 0–8)
ERYTHROCYTE [DISTWIDTH] IN BLOOD BY AUTOMATED COUNT: 13.5 % (ref 11.5–14.5)
EST. GFR  (NO RACE VARIABLE): >60 ML/MIN/1.73 M^2
GLUCOSE SERPL-MCNC: 93 MG/DL (ref 70–110)
HCT VFR BLD AUTO: 41.1 % (ref 40–54)
HGB BLD-MCNC: 13.8 G/DL (ref 14–18)
IMM GRANULOCYTES # BLD AUTO: 0.02 K/UL (ref 0–0.04)
IMM GRANULOCYTES NFR BLD AUTO: 0.5 % (ref 0–0.5)
LYMPHOCYTES # BLD AUTO: 0.9 K/UL (ref 1–4.8)
LYMPHOCYTES NFR BLD: 21.9 % (ref 18–48)
MAGNESIUM SERPL-MCNC: 1.9 MG/DL (ref 1.6–2.6)
MCH RBC QN AUTO: 28 PG (ref 27–31)
MCHC RBC AUTO-ENTMCNC: 33.6 G/DL (ref 32–36)
MCV RBC AUTO: 83 FL (ref 82–98)
MONOCYTES # BLD AUTO: 0.4 K/UL (ref 0.3–1)
MONOCYTES NFR BLD: 10.4 % (ref 4–15)
NEUTROPHILS # BLD AUTO: 2.6 K/UL (ref 1.8–7.7)
NEUTROPHILS NFR BLD: 62.2 % (ref 38–73)
NRBC BLD-RTO: 0 /100 WBC
PHOSPHATE SERPL-MCNC: 3.1 MG/DL (ref 2.7–4.5)
PLATELET # BLD AUTO: 200 K/UL (ref 150–450)
PMV BLD AUTO: 10 FL (ref 9.2–12.9)
POTASSIUM SERPL-SCNC: 3.7 MMOL/L (ref 3.5–5.1)
PROT SERPL-MCNC: 7 G/DL (ref 6–8.4)
RBC # BLD AUTO: 4.93 M/UL (ref 4.6–6.2)
SODIUM SERPL-SCNC: 135 MMOL/L (ref 136–145)
WBC # BLD AUTO: 4.15 K/UL (ref 3.9–12.7)

## 2023-08-20 PROCEDURE — 25000003 PHARM REV CODE 250: Performed by: NURSE PRACTITIONER

## 2023-08-20 PROCEDURE — 99239 HOSP IP/OBS DSCHRG MGMT >30: CPT | Mod: ,,, | Performed by: STUDENT IN AN ORGANIZED HEALTH CARE EDUCATION/TRAINING PROGRAM

## 2023-08-20 PROCEDURE — 92526 ORAL FUNCTION THERAPY: CPT

## 2023-08-20 PROCEDURE — 80053 COMPREHEN METABOLIC PANEL: CPT | Performed by: STUDENT IN AN ORGANIZED HEALTH CARE EDUCATION/TRAINING PROGRAM

## 2023-08-20 PROCEDURE — 99239 PR HOSPITAL DISCHARGE DAY,>30 MIN: ICD-10-PCS | Mod: ,,, | Performed by: STUDENT IN AN ORGANIZED HEALTH CARE EDUCATION/TRAINING PROGRAM

## 2023-08-20 PROCEDURE — 84100 ASSAY OF PHOSPHORUS: CPT | Performed by: STUDENT IN AN ORGANIZED HEALTH CARE EDUCATION/TRAINING PROGRAM

## 2023-08-20 PROCEDURE — 83735 ASSAY OF MAGNESIUM: CPT | Performed by: STUDENT IN AN ORGANIZED HEALTH CARE EDUCATION/TRAINING PROGRAM

## 2023-08-20 PROCEDURE — 36415 COLL VENOUS BLD VENIPUNCTURE: CPT | Performed by: STUDENT IN AN ORGANIZED HEALTH CARE EDUCATION/TRAINING PROGRAM

## 2023-08-20 PROCEDURE — 97116 GAIT TRAINING THERAPY: CPT | Mod: CQ

## 2023-08-20 PROCEDURE — 85025 COMPLETE CBC W/AUTO DIFF WBC: CPT | Performed by: STUDENT IN AN ORGANIZED HEALTH CARE EDUCATION/TRAINING PROGRAM

## 2023-08-20 RX ORDER — LISINOPRIL 10 MG/1
20 TABLET ORAL DAILY
Qty: 180 TABLET | Refills: 3 | Status: SHIPPED | OUTPATIENT
Start: 2023-08-21 | End: 2024-08-20

## 2023-08-20 RX ORDER — LEVETIRACETAM 750 MG/1
1500 TABLET ORAL 2 TIMES DAILY
Qty: 120 TABLET | Refills: 11 | Status: SHIPPED | OUTPATIENT
Start: 2023-08-20 | End: 2024-08-19

## 2023-08-20 RX ADMIN — ACETAMINOPHEN 650 MG: 325 TABLET ORAL at 09:08

## 2023-08-20 RX ADMIN — MUPIROCIN: 20 OINTMENT TOPICAL at 09:08

## 2023-08-20 RX ADMIN — SENNOSIDES AND DOCUSATE SODIUM 1 TABLET: 50; 8.6 TABLET ORAL at 09:08

## 2023-08-20 RX ADMIN — LISINOPRIL 10 MG: 10 TABLET ORAL at 09:08

## 2023-08-20 RX ADMIN — ASPIRIN 81 MG 81 MG: 81 TABLET ORAL at 09:08

## 2023-08-20 RX ADMIN — RIVAROXABAN 20 MG: 10 TABLET, FILM COATED ORAL at 06:08

## 2023-08-20 RX ADMIN — LEVETIRACETAM 1500 MG: 500 TABLET, FILM COATED ORAL at 09:08

## 2023-08-20 RX ADMIN — LEVETIRACETAM 1500 MG: 500 TABLET, FILM COATED ORAL at 07:08

## 2023-08-20 NOTE — PT/OT/SLP PROGRESS
Physical Therapy Treatment    Patient Name:  Sam Zamora   MRN:  [id number]    Recommendations:     Discharge Recommendations: home health PT  Discharge Equipment Recommendations: none  Barriers to discharge: None    Assessment:     Sam Zamora is a 63 y.o. male admitted with a medical diagnosis of Status epilepticus.  He presents with the following impairments/functional limitations: weakness, impaired endurance, impaired balance.    Rehab Prognosis: Good; patient would benefit from acute skilled PT services to address these deficits and reach maximum level of function.    Recent Surgery: * No surgery found *      Plan:     During this hospitalization, patient to be seen 4 x/week to address the identified rehab impairments via gait training, therapeutic activities, therapeutic exercises, neuromuscular re-education and progress toward the following goals:    Plan of Care Expires:  09/18/23    Subjective     Chief Complaint: none   Patient/Family Comments/goals: go home  Pain/Comfort:  Pain Rating 1: 0/10      Objective:     Communicated with RN prior to session.  Patient found HOB elevated with barragan catheter, telemetry upon PT entry to room.     General Precautions: Standard, fall, aspiration  Orthopedic Precautions: N/A  Braces: N/A  Respiratory Status: Room air     Functional Mobility:  Bed Mobility:     Supine to Sit: independence  Sit to Supine: independence  Transfers:     Sit to Stand:  supervision with no AD  Gait: 400 feet no AD supervision; pt with increased lateral sway      AM-PAC 6 CLICK MOBILITY  Turning over in bed (including adjusting bedclothes, sheets and blankets)?: 4  Sitting down on and standing up from a chair with arms (e.g., wheelchair, bedside commode, etc.): 4  Moving from lying on back to sitting on the side of the bed?: 4  Moving to and from a bed to a chair (including a wheelchair)?: 4  Need to walk in hospital room?: 3  Climbing 3-5 steps with a railing?: 3  Basic Mobility  Total Score: 22       Treatment & Education:  Educaiton provided on safety, calling for assistance.    Bedside table in front of patient and area set up for function, convenience, and safety. RN aware of patient's mobility needs and status. Questions/concerns addressed within PTA scope of practice; patient  with no further questions. Time was provided for active listening, discussion of health disposition, and discussion of safe discharge.      Patient left HOB elevated with all lines intact and call button in reach..    GOALS:   Multidisciplinary Problems       Physical Therapy Goals          Problem: Physical Therapy    Goal Priority Disciplines Outcome Goal Variances Interventions   Physical Therapy Goal     PT, PT/OT Ongoing, Progressing     Description: Goals to be met by: 23     Patient will increase functional independence with mobility by performin. Supine to sit with [place] with HOB flat.  2. Sit to stand transfer with [place]  3. Gait  x 150 feet with [place] using No Assistive Device.   4. Ascend/descend 4 stair with right Handrails [place] using No Assistive Device.   5. Stand for 2 minutes with [place] using No Assistive Device to assist with ADLs.  6. Lower extremity exercise program x15 reps per handout, with independence                         Time Tracking:     PT Received On: 23  PT Start Time: 1400     PT Stop Time: 1412  PT Total Time (min): 12 min     Billable Minutes: Gait Training 12    Treatment Type: Treatment  PT/PTA: PTA     Number of PTA visits since last PT visit: 2023

## 2023-08-20 NOTE — PLAN OF CARE
[facility] ([place] [address])      HOME HEALTH ORDERS  FACE TO FACE ENCOUNTER    Patient Name: Sam Zamora  YOB: 1960    PCP: No primary care provider on file.   PCP Address: No primary physician on file.  PCP Phone Number: None  PCP Fax: None    Encounter Date: 8/15/23    Admit to Home Health    Diagnoses:  Active Hospital Problems    Diagnosis  POA    *Status epilepticus [G40.901]  Yes    HTN (hypertension) [I10]  No    Nonintractable epilepsy with status epilepticus [G40.901]  Yes    Presence of IVC filter [Z95.828]  Not Applicable    History of DVT in adulthood [Z86.718]  Not Applicable    Chronic anticoagulation [Z79.01]  Not Applicable    Elevated lactic acid level [R79.89]  Yes    Hyperammonemia [E72.20]  Yes    Aspiration pneumonia of right lower lobe [J69.0]  Yes      Resolved Hospital Problems    Diagnosis Date Resolved POA    Encephalopathy [G93.40] 08/19/2023 Unknown    On mechanically assisted ventilation [Z99.11] 08/19/2023 Not Applicable    Endotracheally intubated [Z97.8] 08/19/2023 Yes       Follow Up Appointments:  No future appointments.    Allergies:Review of patient's allergies indicates:  No Known Allergies    Medications: Review discharge medications with patient and family and provide education.    Current Facility-Administered Medications   Medication Dose Route Frequency Provider Last Rate Last Admin    acetaminophen tablet 650 mg  650 mg Oral Q6H PRN Miinea, Norma, NP   650 mg at 08/20/23 0907    aspirin chewable tablet 81 mg  81 mg Oral Daily Miinea, Norma, NP   81 mg at 08/20/23 0905    levETIRAcetam tablet 1,500 mg  1,500 mg Oral BID Miinea, Norma, NP   1,500 mg at 08/20/23 0906    lisinopriL tablet 10 mg  10 mg Oral Daily Miinea, Norma, NP   10 mg at 08/20/23 0906    mupirocin 2 % ointment   Nasal BID Miinea, Norma, NP   Given at 08/20/23 0911    rivaroxaban tablet 20 mg  20 mg Oral Daily with dinner Miinea, Norma, NP   20 mg at 08/19/23 4712     senna-docusate 8.6-50 mg per tablet 1 tablet  1 tablet Oral Daily Norma Case NP   1 tablet at 08/20/23 0905    sodium chloride 0.9% flush 10 mL  10 mL Intravenous PRN Norma Case NP         Current Discharge Medication List        START taking these medications    Details   lisinopriL 10 MG tablet Take 2 tablets (20 mg total) by mouth once daily.  Qty: 180 tablet, Refills: 3    Comments: .      rivaroxaban (XARELTO) 20 mg Tab Take 1 tablet (20 mg total) by mouth daily with dinner or evening meal.  Qty: 30 tablet, Refills: 2           CONTINUE these medications which have CHANGED    Details   levETIRAcetam (KEPPRA) 750 MG Tab Take 2 tablets (1,500 mg total) by mouth 2 (two) times daily.  Qty: 120 tablet, Refills: 11           CONTINUE these medications which have NOT CHANGED    Details   aspirin (ECOTRIN) 81 MG EC tablet Take 81 mg by mouth once daily.               I have seen and examined this patient within the last 30 days. My clinical findings that support the need for the home health skilled services and home bound status are the following:no   Weakness/numbness causing balance and gait disturbance due to Seizure making it taxing to leave home.     Diet:   soft diet    Labs:  none    Referrals/ Consults  Physical Therapy to evaluate and treat. Evaluate for home safety and equipment needs; Establish/upgrade home exercise program. Perform / instruct on therapeutic exercises, gait training, transfer training, and Range of Motion.  Occupational Therapy to evaluate and treat. Evaluate home environment for safety and equipment needs. Perform/Instruct on transfers, ADL training, ROM, and therapeutic exercises.  Speech Therapy  to evaluate and treat for  Language and Swallowing.    Activities:   activity as tolerated    Nursing:   Agency to admit patient within 24 hours of hospital discharge unless specified on physician order or at patient request    SN to complete comprehensive assessment including routine  vital signs. Instruct on disease process and s/s of complications to report to MD. Review/verify medication list sent home with the patient at time of discharge  and instruct patient/caregiver as needed. Frequency may be adjusted depending on start of care date.     Skilled nurse to perform up to 3 visits PRN for symptoms related to diagnosis    Notify MD if SBP > 160 or < 90; DBP > 90 or < 50; HR > 120 or < 50; Temp > 101; O2 < 88%; Other:       Ok to schedule additional visits based on staff availability and patient request on consecutive days within the home health episode.    When multiple disciplines ordered:    Start of Care occurs on Sunday - Wednesday schedule remaining discipline evaluations as ordered on separate consecutive days following the start of care.    Thursday SOC -schedule subsequent evaluations Friday and Monday the following week.     Friday - Saturday SOC - schedule subsequent discipline evaluations on consecutive days starting Monday of the following week.    For all post-discharge communication and subsequent orders please contact patient's primary care physician. If unable to reach primary care physician or do not receive response within 30 minutes, please contact PCP for clinical staff order clarification    Miscellaneous   None    Home Health Aide:  Physical Therapy Three times weekly, Occupational Therapy Three times weekly, Speech Language Pathology Three times weekly, and Home Health Aide Three times weekly    Wound Care Orders  yes:       Altered Skin Integrity 08/18/23 0935 Right dorsal Elbow Intact skin with non-blanchable redness of localized area  Change dressing q5 days/PRN for soilage. Offload arm w/ pillows.    I certify that this patient is confined to his home and needs physical therapy, speech therapy, and occupational therapy.

## 2023-08-20 NOTE — PT/OT/SLP PROGRESS
Speech Language Pathology Treatment    Patient Name:  Sam Zamora   MRN:  [id number]  Admitting Diagnosis: Status epilepticus    Recommendations:                 General Recommendations:  Dysphagia therapy  Diet recommendations:  Dental Soft, Thin   Aspiration Precautions:  1 bite/sip at a time  Eliminate distractions  Feed only when awake/alert  HOB to 90 degrees  Small bites/sips   General Precautions: Standard, aspiration, fall  Communication strategies:  provide increased time to answer    Assessment:     Sam Zamora is a 63 y.o. male with an SLP diagnosis of Dysphagia.  He presents with improved tolerance of PO trials though intermittent signs of airway compromise with thin liquids. Will opt for upgrade to dental soft textures and cautious initiation of thin liquids. SLP to continue to follow for ongoing assessment of diet tolerance.     Subjective     Pt found resting in bed upon SLP entry into room. Pt agreeable to participate in all aspects of session.      Patient goals: none stated     Pain/Comfort:  Pain Rating 1: 0/10    Respiratory Status: Room air    Objective:     Has the patient been evaluated by SLP for swallowing?   Yes  Keep patient NPO? No   Current Respiratory Status:        Pt seen for ongoing dysphagia therapy. HOB elevated for all PO intake. Pt awake and alert throughout session with good vocal quality. He self-regulated single sips of thin liquids via cup edge without difficulty. During consecutive open cup sips and single straw sips, intermittent dry coughing and throat clearing exhibited though no change in vocal quality exhibited. Bites of semi solids and solids tolerated without difficulty. Given low risk for development of aspiration-related complications, will opt for dental soft diet with thin liquids at this time. SLP provided education regarding diet consistency recommendations, overt s/s of aspiration, safe swallow strategies, and SLP POC.  Pt verbalized understanding but  would continue to benefit from ongoing education. Whiteboard updated. Secure chat sent to medical team regarding impressions and recommendations with MD verbalizing understanding.     Goals:   Multidisciplinary Problems       SLP Goals          Problem: SLP    Goal Priority Disciplines Outcome   SLP Goal     SLP Ongoing, Progressing   Description: Speech Language Pathology Goals  Goals expected to be met by 9/15:    1. The patient will participate in ongoing swallow evaluation to determine least restrictive diet.  2. The patient will participate in a cognitive-linguistic evaluation to determine therapeutic needs.                                Plan:     Patient to be seen:  4 x/week   Plan of Care expires:  09/15/23  Plan of Care reviewed with:  patient   SLP Follow-Up:  Yes       Discharge recommendations:  other (see comments)   Barriers to Discharge:  Level of Skilled Assistance Needed      Time Tracking:     SLP Treatment Date:   08/20/23  Speech Start Time:  1321  Speech Stop Time:  1331     Speech Total Time (min):  10 min    Billable Minutes: Treatment Swallowing Dysfunction 10      08/20/2023

## 2023-08-20 NOTE — PLAN OF CARE
SW spoke with pt's sister, Ms. Bowie. Reported that she will come and get pt tonight. Provided her with nurse's Tyler to call once she arrives. Updated team via secure chat.    PEDRO Gayle, hospitalsW  Weekend -[facility] [phone]

## 2023-08-20 NOTE — PLAN OF CARE
Pt slept well this shift. A&Ox. VSS. T&RQ2. Safety precautions in place. Call light in reach. No further concerns noted at this time.    Problem: Adult Inpatient Plan of Care  Goal: Plan of Care Review  Outcome: Ongoing, Progressing  Goal: Patient-Specific Goal (Individualized)  Outcome: Ongoing, Progressing  Goal: Absence of Hospital-Acquired Illness or Injury  Outcome: Ongoing, Progressing  Goal: Optimal Comfort and Wellbeing  Outcome: Ongoing, Progressing  Goal: Readiness for Transition of Care  Outcome: Ongoing, Progressing     Problem: Impaired Wound Healing  Goal: Optimal Wound Healing  Outcome: Ongoing, Progressing     Problem: Communication Impairment (Mechanical Ventilation, Invasive)  Goal: Effective Communication  Outcome: Ongoing, Progressing     Problem: Fall Injury Risk  Goal: Absence of Fall and Fall-Related Injury  Outcome: Ongoing, Progressing     Problem: Skin Injury Risk Increased  Goal: Skin Health and Integrity  Outcome: Ongoing, Progressing

## 2023-08-20 NOTE — PLAN OF CARE
08/20/23 1638   Post-Acute Status   Post-Acute Authorization Home Health   Home Health Status Referrals Sent   Coverage [employer]   Discharge Delays None known at this time   Discharge Plan   Discharge Plan A Home;Home with family;Home Health     [facility] sent referrals to 25  health agencies. [facility] also made doctor aware that the patient has Medicaid and HH will may be difficult to get for this patient.       RAFAEL Kamara, MSW-LMSW  Medical Social Worker/  ER Department '

## 2023-08-21 NOTE — PLAN OF CARE
08/21/23 0825   Final Note   Assessment Type Final Discharge Note   Anticipated Discharge Disposition Home MA  (dc'd home with family)   What phone number can be called within the next 1-3 days to see how you are doing after discharge? [phone]   Hospital Resources/Appts/Education Provided Provided patient/caregiver with written discharge plan information   Post-Acute Status   Post-Acute Authorization Home Health   Home Health Status Referrals Sent   Coverage MEDICAID - [place] BLUE [place]   Discharge Delays None known at this time     Holly Driver RN  Case Management  [facility]  [phone]

## 2023-08-21 NOTE — DISCHARGE SUMMARY
[facility] ([place] [address])  [facility] Medicine  Discharge Summary      Patient Name: Sam Zamora  MRN: [id number]  RAMESH: [id number]  Patient Class: IP- Inpatient  Admission Date: 8/15/2023  Hospital Length of Stay: 5 days  Discharge Date and Time: 8/20/23 430p  Attending Physician: Cheryle Watts MD   Discharging Provider: Cheryle Watts MD  Primary Care Provider: No primary care provider on file.  Hospital Medicine Team: [facility] Cheryle Watts MD  Primary Care Team: [facility]    HPI:   Sam Zamora is a 63 year old male with a medical history significant for epilepsy maintained on Keppra 750mg QID, DVT on Xarelto s/p IVC filter placement who presents as a transfer from OSH for concern of status epilepticus. Patient presented to OSH after being found down at home. EMS noted 3 witnessed seizure events without return to baseline. He was intubated at OSH due to low GCS. Received 4.5g Keppra load and started on Propofol and Fentanyl. CTH showed chronic small vessel ischemic change without acute findings. CT Chest with opacification of right lower lobe, and fusiform dilation of the ascending thoracic aorta (4.2 cm).  Lactate 4.9, Ammonia 43, UDS positive for amphetamines and benzodiazepines (given per EMS).      Recommendation was made to transfer patient to [facility] for higher level of care on continuous EEG monitoring.       * No surgery found *      Hospital Course:   Admitted to [facility]. EEG no seizures per epilepsy, weaned sedation, passed SBT and extubated. Remained stable post-extubation and stepped down to floor.   No further seizures, cont keppra at discharge, f/u neurology.   PT/OT/SLP ordered for         Goals of Care Treatment Preferences:  Code Status: Full Code      Consults:   Consults (From admission, onward)        Status Ordering Provider     Inpatient consult to Registered Dietitian/Nutritionist  Once        Provider:  (Not yet assigned)    MARTHA Villatoro      Inpatient consult to Registered Dietitian/Nutritionist  Once        Provider:  (Not yet assigned)    Completed SEBAS RAMIRES          Neuro  * Status epilepticus  - epilepsy maintained on Keppra 750mg QID  - patient reports missing home doses of keppra prior to onset of seizure. Denies any other sxs as seizure cause.   - 3 witnessed seizure events without return to baseline. He was intubated at OSH due to low GCS. Received 4.5g Keppra load and started on Propofol and Fentanyl.  - CTH showed chronic small vessel ischemic change without acute findings.   - MRI Brain w/o acute intracranial pathology   - EEG done: generalized background slowing consistent with diffuse cortical dysfunction and a moderate encephalopathy with evidence of subcortical/deep midline dysfunction bilaterally.  This finding is nonspecific with regards to etiology but can be seen in the setting of toxic/metabolic derangements, infection, and as a medication effect.  There are no prominent focal findings however encephalopathy obscures focal findings.  There are no pushbutton activations, no epileptiform discharges, and no electrographic seizures  - extubated and stable on RA  - cont keppra  1500 BID  - neuro checks q4      Nonintractable epilepsy with status epilepticus  - see status epilepticus       Pulmonary  Aspiration pneumonia of right lower lobe  - 2/2 seizure  - stable on RA, afebrile   - monitor   - aspiration precautions  - SLP following - cont SLP with       Cardiac/Vascular  HTN (hypertension)  - cont lisinopril   - monitor BP and adjust regimen as needed      Hematology  Chronic anticoagulation  - cont xarelto      History of DVT in adulthood  - cont xarelto       Presence of IVC filter  - noted  - cont xarelto      Endocrine  Hyperammonemia  - reolved      Other  Elevated lactic acid level  - elevated on admit, likely 2/2 to seizure  - resolved         Final Active Diagnoses:    Diagnosis Date Noted POA    PRINCIPAL PROBLEM:  Status  epilepticus [G40.901] 08/15/2023 Yes    HTN (hypertension) [I10] 08/19/2023 No    Nonintractable epilepsy with status epilepticus [G40.901] 08/15/2023 Yes    Presence of IVC filter [Z95.828] 08/15/2023 Not Applicable    History of DVT in adulthood [Z86.718] 08/15/2023 Not Applicable    Chronic anticoagulation [Z79.01] 08/15/2023 Not Applicable    Elevated lactic acid level [R79.89] 08/15/2023 Yes    Hyperammonemia [E72.20] 08/15/2023 Yes    Aspiration pneumonia of right lower lobe [J69.0] 08/15/2023 Yes      Problems Resolved During this Admission:    Diagnosis Date Noted Date Resolved POA    Encephalopathy [G93.40] 08/16/2023 08/19/2023 Unknown    On mechanically assisted ventilation [Z99.11] 08/15/2023 08/19/2023 Not Applicable    Endotracheally intubated [Z97.8] 08/15/2023 08/19/2023 Yes       Discharged Condition: stable    Disposition: Home or Self Care    Follow Up:    Patient Instructions:      Ambulatory referral/consult to Neurology   Standing Status: Future   Referral Priority: Urgent Referral Type: Consultation   Referral Reason: Specialty Services Required   Requested Specialty: Neurology   Number of Visits Requested: 1     Ambulatory referral/consult to Internal Medicine   Standing Status: Future   Referral Priority: Urgent Referral Type: Consultation   Referral Reason: Specialty Services Required   Requested Specialty: Internal Medicine   Number of Visits Requested: 1     Diet Dysphagia Mechanical Soft     Notify your health care provider if you experience any of the following:  difficulty breathing or increased cough     Notify your health care provider if you experience any of the following:   Order Comments: Seizure     Activity as tolerated   Order Comments: Seizure precautions       Significant Diagnostic Studies: Labs: All labs within the past 24 hours have been reviewed    Pending Diagnostic Studies:     None         Medications:  Reconciled Home Medications:      Medication List       START taking these medications    lisinopriL 10 MG tablet  Take 2 tablets (20 mg total) by mouth once daily.  Start taking on: August 21, 2023     rivaroxaban 20 mg Tab  Commonly known as: XARELTO  Take 1 tablet (20 mg total) by mouth daily with dinner or evening meal.        CHANGE how you take these medications    levETIRAcetam 750 MG Tab  Commonly known as: KEPPRA  Take 2 tablets (1,500 mg total) by mouth 2 (two) times daily.  What changed: See the new instructions.        CONTINUE taking these medications    aspirin 81 MG EC tablet  Commonly known as: ECOTRIN  Take 81 mg by mouth once daily.            Indwelling Lines/Drains at time of discharge:   Lines/Drains/Airways     Drain  Duration           Male External Urinary Catheter 08/16/23 0723 Medium 4 days                Time spent on the discharge of patient: 40 minutes         Cheryle Watts MD  Department of Hospital Medicine  [facility] - Intensive Care ([place])

## 2023-08-21 NOTE — PLAN OF CARE
[facility] ([facility]  Discharge Final Note    Primary Care Provider: No primary care provider on file.    Expected Discharge Date: 8/20/2023    Final Discharge Note (most recent)       Final Note - 08/21/23 0825          Final Note    Assessment Type Final Discharge Note     Anticipated Discharge Disposition Home or Self Care with Planned Readmission   dc'd home with family    What phone number can be called within the next 1-3 days to see how you are doing after discharge? [phone]     Hospital Resources/Appts/Education Provided Provided patient/caregiver with written discharge plan information        Post-Acute Status    Post-Acute Authorization Home Health     Home Health Status Referrals Sent     Coverage MEDICAID - [place]     Discharge Delays None known at this time                     Holly Driver RN  Case Management  [facility]  [phone]

## 2023-08-21 NOTE — ASSESSMENT & PLAN NOTE
- 2/2 seizure  - stable on RA, afebrile   - monitor   - aspiration precautions  - SLP following - cont SLP with HH

## 2023-08-22 PROCEDURE — G0179 MD RECERTIFICATION HHA PT: HCPCS | Mod: ,,, | Performed by: STUDENT IN AN ORGANIZED HEALTH CARE EDUCATION/TRAINING PROGRAM

## 2023-08-22 PROCEDURE — G0179 PR HOME HEALTH MD RECERTIFICATION: ICD-10-PCS | Mod: ,,, | Performed by: STUDENT IN AN ORGANIZED HEALTH CARE EDUCATION/TRAINING PROGRAM

## 2023-08-22 NOTE — NURSING
"0700 Patient AAOx4, c/o mild shoulder pain relieved with tylenol. Per previous nightshift RN, patient is likely to be discharged today 8/20.     1014 message received from Dr. Watts requesting that this RN contact speech therapist for consult. SLP notified.    1030 Patient's sister Azeb called in requesting an update on patient's condition and discharge status. Informed her that we are awaiting two consultations prior to discharge but I will call her if I hear anything new.    1330 patient seen by SLP and cleared for soft, bite sized diet with thin liquids and meds whole with soft food. No s/s dysphagia with oral intake noted.    1455 Inquired re: discharge planning, Dr. Watts reports he is okay to be discharged with home health. Called back Azeb and informed her of same.    1553 D/C orders placed. Called patient's sister back and informed her of same, requesting that patient be discharged in the morning due to living 1.5 hours from the hospital and not wanting to drive back home in the dark.     1630 Patient's sister Azeb and she says that "its going to be pretty hard to come pick him up at this time and she wants to know if he can be discharged in the morning?" Relayed this to Dr. Watts who stated that patient can go home in an uber or via wheelchair van. Informed her that patient lives almost two hours away.    Dr. Watts reports patient cannot stay another night per Medicaid coverage so family will have to pay oop. SW called sister Azeb and spoke with her regarding discharge. Azeb states that her and her daughter are going to come pick him up.       Family at bedside 1845. D/c instructions provided, all questions answered to the content of family and patient. No concerns at this time. Patient transferred safely off of unit via wheelchair.  "

## 2023-08-23 ENCOUNTER — PATIENT OUTREACH (OUTPATIENT)
Dept: ADMINISTRATIVE | Facility: CLINIC | Age: 63
End: 2023-08-23
Payer: MEDICAID

## 2023-08-23 NOTE — PROGRESS NOTES
C3 nurse attempted to contact Sam Zamora, his sister Azeb and his ex-spouse, Nicolle, for a TCC post hospital discharge follow up call. No answer, left voice mail on patient's phone and Nicolle's phone.  Patient's sister, Azeb, informed he was staying at his at ex-wife's home.   The patient does not have a scheduled HOSFU appointment, and the pt [facility].

## 2023-08-24 NOTE — PROGRESS NOTES
C3 nurse spoke with Sam Zamora  for a TCC post hospital discharge follow up call. The patient reports does not have a scheduled HOSFU appointment.  Patient advised to establish care with a PCP to schedule a HOSPFU within 5-7 days, patient voiced understanding.

## 2023-08-28 VITALS
SYSTOLIC BLOOD PRESSURE: 160 MMHG | TEMPERATURE: 98 F | DIASTOLIC BLOOD PRESSURE: 82 MMHG | RESPIRATION RATE: 18 BRPM | OXYGEN SATURATION: 98 % | WEIGHT: 176.38 LBS | HEIGHT: 74 IN | BODY MASS INDEX: 22.63 KG/M2 | HEART RATE: 82 BPM

## 2023-09-19 ENCOUNTER — EXTERNAL HOME HEALTH (OUTPATIENT)
Dept: HOME HEALTH SERVICES | Facility: HOSPITAL | Age: 63
End: 2023-09-19
Payer: MEDICAID

## 2023-09-22 ENCOUNTER — DOCUMENT SCAN (OUTPATIENT)
Dept: HOME HEALTH SERVICES | Facility: HOSPITAL | Age: 63
End: 2023-09-22
Payer: MEDICAID

## 2023-11-20 PROBLEM — J69.0 ASPIRATION PNEUMONIA OF RIGHT LOWER LOBE: Status: RESOLVED | Noted: 2023-08-15 | Resolved: 2023-11-20
